# Patient Record
Sex: FEMALE | Race: ASIAN | URBAN - METROPOLITAN AREA
[De-identification: names, ages, dates, MRNs, and addresses within clinical notes are randomized per-mention and may not be internally consistent; named-entity substitution may affect disease eponyms.]

---

## 2018-03-13 ENCOUNTER — OFFICE VISIT (OUTPATIENT)
Dept: URGENT CARE | Facility: URGENT CARE | Age: 68
End: 2018-03-13
Payer: MEDICARE

## 2018-03-13 VITALS
RESPIRATION RATE: 18 BRPM | HEART RATE: 106 BPM | DIASTOLIC BLOOD PRESSURE: 79 MMHG | TEMPERATURE: 97.4 F | SYSTOLIC BLOOD PRESSURE: 144 MMHG | OXYGEN SATURATION: 96 %

## 2018-03-13 DIAGNOSIS — K21.9 GASTROESOPHAGEAL REFLUX DISEASE WITHOUT ESOPHAGITIS: Primary | ICD-10-CM

## 2018-03-13 PROCEDURE — 99203 OFFICE O/P NEW LOW 30 MIN: CPT | Performed by: PHYSICIAN ASSISTANT

## 2018-03-13 RX ORDER — OMEPRAZOLE 40 MG/1
40 CAPSULE, DELAYED RELEASE ORAL DAILY
Qty: 14 CAPSULE | Refills: 0 | Status: SHIPPED | OUTPATIENT
Start: 2018-03-13 | End: 2018-09-06

## 2018-03-13 ASSESSMENT — ENCOUNTER SYMPTOMS
CHILLS: 0
DIARRHEA: 0
VOMITING: 0
HEADACHES: 0
SHORTNESS OF BREATH: 0
NAUSEA: 0
ABDOMINAL PAIN: 0
FEVER: 0
FOCAL WEAKNESS: 0

## 2018-03-13 NOTE — MR AVS SNAPSHOT
After Visit Summary   3/13/2018    Ying Mejia    MRN: [id number]           Patient Information     Date Of Birth          1950        Visit Information        Provider Department      3/13/2018 7:40 PM Anika Hollingsworth PA-C [facility]        Today's Diagnoses     Gastroesophageal reflux disease without esophagitis    -  1      Care Instructions      Lifestyle Changes for Controlling GERD  When you have GERD, stomach acid feels as if it s backing up toward your mouth. Whether or not you take medicine to control your GERD, your symptoms can often be improved with lifestyle changes. Talk to your healthcare provider about the following suggestions. These suggestions may help you get relief from your symptoms.      Raise your head  Reflux is more likely to strike when you re lying down flat, because stomach fluid can flow backward more easily. Raising the head of your bed 4 to 6 inches can help. To do this:    Slide blocks or books under the legs at the head of your bed. Or, place a wedge under the mattress. [employer] can make a suitable wedge for you. The wedge should run from your waist to the top of your head.    Don t just prop your head on several pillows. This increases pressure on your stomach. It can make GERD worse.  Watch your eating habits  Certain foods may increase the acid in your stomach or relax the lower esophageal sphincter. This makes GERD more likely. It s best to avoid the following if they cause you symptoms:    Coffee, tea, and carbonated drinks (with and without caffeine)    Fatty, fried, or spicy food    Mint, chocolate, onions, and tomatoes    Peppermint    Any other foods that seem to irritate your stomach or cause you pain  Relieve the pressure  Tips include the following:    Eat smaller meals, even if you have to eat more often.    Don t lie down right after you eat. Wait a few hours for your stomach to empty.    Avoid tight  "belts and tight-fitting clothes.    Lose excess weight.  Tobacco and alcohol  Avoid smoking tobacco and drinking alcohol. They can make GERD symptoms worse.  Date Last Reviewed: 2016-2017 [employer]. [address] [place] [address], [place] [address]. All rights reserved. This information is not intended as a substitute for professional medical care. Always follow your healthcare professional's instructions.                Follow-ups after your visit        Who to contact     If you have questions or need follow up information about today's clinic visit or your schedule please contact [facility] directly at [phone].  Normal or non-critical lab and imaging results will be communicated to you by MyChart, letter or phone within 4 business days after the clinic has received the results. If you do not hear from us within 7 days, please contact the clinic through [employer] or phone. If you have a critical or abnormal lab result, we will notify you by phone as soon as possible.  Submit refill requests through [employer] or call your pharmacy and they will forward the refill request to us. Please allow 3 business days for your refill to be completed.          Additional Information About Your Visit        [employer] Information     [employer] lets you send messages to your doctor, view your test results, renew your prescriptions, schedule appointments and more. To sign up, go to [email or web address] . Click on \"Log in\" on the left side of the screen, which will take you to the Welcome page. Then click on \"Sign up Now\" on the right side of the page.     You will be asked to enter the access code listed below, as well as some personal information. Please follow the directions to create your username and password.     Your access code is: [email or web address]  Expires: 2018  8:05 PM     Your access code will  in 90 days. If you need help or a new code, please call your [place] clinic " or [phone].        Care EveryWhere ID     This is your Care EveryWhere ID. This could be used by other organizations to access your [facility] [employer] medical records  [id number]        Your Vitals Were     Pulse Temperature Respirations Pulse Oximetry          106 97.4  F (36.3  C) (Oral) 18 96%         Blood Pressure from Last 3 Encounters:   03/13/18 144/79   10/19/12 110/70   01/06/09 102/68    Weight from Last 3 Encounters:   10/19/12 140 lb 3.2 oz (63.6 kg)   01/06/09 131 lb (59.4 kg)              Today, you had the following     No orders found for display         Today's Medication Changes          These changes are accurate as of 3/13/18  8:05 PM.  If you have any questions, ask your nurse or doctor.               Start taking these medicines.        Dose/Directions    omeprazole 40 MG capsule   Commonly known as:  priLOSEC   Used for:  Gastroesophageal reflux disease without esophagitis   Started by:  Anika Hollingsworth PA-C        Dose:  40 mg   Take 1 capsule (40 mg) by mouth daily Take 30-60 minutes before a meal.   Quantity:  14 capsule   Refills:  0            Where to get your medicines      These medications were sent to [id number] - [place], [place] - [address]  [address] [place] [address]     Phone:  [phone]     omeprazole 40 MG capsule                Primary Care Provider Fax #    Provider Not In System [phone]                Equal Access to Services     TERESA HOWARD AH: Hadii hawk daviso Socarlie, waaxda luqadaha, qaybta kaalmada adeegyada, dave armas. So [facility] [phone].    ATENCIÓN: Si habla español, tiene a torres disposición servicios gratuitos de asistencia lingüística. Zhannaame al [phone].    We comply with applicable federal civil rights laws and [place] laws. We do not discriminate on the basis of race, color, national origin, age, disability, sex, sexual orientation, or gender identity.            Thank you!     Thank you for  choosing [facility]  for your care. Our goal is always to provide you with excellent care. Hearing back from our patients is one way we can continue to improve our services. Please take a few minutes to complete the written survey that you may receive in the mail after your visit with us. Thank you!             Your Updated Medication List - Protect others around you: Learn how to safely use, store and throw away your medicines at www.disposemymeds.org.          This list is accurate as of 3/13/18  8:05 PM.  Always use your most recent med list.                   Brand Name Dispense Instructions for use Diagnosis    ciprofloxacin 500 MG tablet    CIPRO    14 tablet    Take 1 tablet by mouth 2 times daily.    UTI (urinary tract infection)       omeprazole 40 MG capsule    priLOSEC    14 capsule    Take 1 capsule (40 mg) by mouth daily Take 30-60 minutes before a meal.    Gastroesophageal reflux disease without esophagitis

## 2018-03-14 NOTE — NURSING NOTE
Chief Complaint   Patient presents with     Dizziness     Dizziness for few days     Urgent Care       Initial /79  Pulse 106  Temp 97.4  F (36.3  C) (Oral)  Resp 18  SpO2 96% There is no height or weight on file to calculate BMI.  Medication Reconciliation: unable or not appropriate to perform    Gio Mckenzie, CMA

## 2018-03-14 NOTE — PATIENT INSTRUCTIONS
Lifestyle Changes for Controlling GERD  When you have GERD, stomach acid feels as if it s backing up toward your mouth. Whether or not you take medicine to control your GERD, your symptoms can often be improved with lifestyle changes. Talk to your healthcare provider about the following suggestions. These suggestions may help you get relief from your symptoms.      Raise your head  Reflux is more likely to strike when you re lying down flat, because stomach fluid can flow backward more easily. Raising the head of your bed 4 to 6 inches can help. To do this:    Slide blocks or books under the legs at the head of your bed. Or, place a wedge under the mattress. [employer] can make a suitable wedge for you. The wedge should run from your waist to the top of your head.    Don t just prop your head on several pillows. This increases pressure on your stomach. It can make GERD worse.  Watch your eating habits  Certain foods may increase the acid in your stomach or relax the lower esophageal sphincter. This makes GERD more likely. It s best to avoid the following if they cause you symptoms:    Coffee, tea, and carbonated drinks (with and without caffeine)    Fatty, fried, or spicy food    Mint, chocolate, onions, and tomatoes    Peppermint    Any other foods that seem to irritate your stomach or cause you pain  Relieve the pressure  Tips include the following:    Eat smaller meals, even if you have to eat more often.    Don t lie down right after you eat. Wait a few hours for your stomach to empty.    Avoid tight belts and tight-fitting clothes.    Lose excess weight.  Tobacco and alcohol  Avoid smoking tobacco and drinking alcohol. They can make GERD symptoms worse.  Date Last Reviewed: 7/1/2016 2000-2017 [employer]. [address] [place] [address]. All rights reserved. This information is not intended as a substitute for professional medical care. Always follow your healthcare  professional's instructions.

## 2018-03-14 NOTE — PROGRESS NOTES
HPI  March 13, 2018    HPI: Ying Mejia is a 67 year old female who complains of moderate increased gas & burping onset 4 days ago. Symptoms were present on Fri after eating lasagna, Sunday and today after drinking orange juice. She reports she feels slightly malaised when this happens until she can belch and get rid of the gas. No treatments tried. Denies fever/chills, HA, focal weakness, neck pain, CP, SOB, abd pain, N/V/D, rash, or any other symptoms.     History reviewed. No pertinent past medical history.  History reviewed. No pertinent surgical history.  Social History   Substance Use Topics     Smoking status: Never Smoker     Smokeless tobacco: Never Used     Alcohol use Not on file     There is no problem list on file for this patient.    Family History   Problem Relation Age of Onset     Family history unknown: Yes        Problem list, Medication list, Allergies, and Medical/Social/Surgical histories reviewed in [facility] and updated as appropriate.      Review of Systems   Constitutional: Positive for malaise/fatigue. Negative for chills and fever.   Respiratory: Negative for shortness of breath.    Cardiovascular: Negative for chest pain.   Gastrointestinal: Negative for abdominal pain, diarrhea, nausea and vomiting.        Gas/belching   Skin: Negative for rash.   Neurological: Negative for focal weakness and headaches.   All other systems reviewed and are negative.        Physical Exam   Constitutional: She is oriented to person, place, and time and well-developed, well-nourished, and in no distress.   HENT:   Head: Normocephalic and atraumatic.   Eyes: EOM are normal. Pupils are equal, round, and reactive to light.   Cardiovascular: Normal rate, regular rhythm and normal heart sounds.    Pulmonary/Chest: Effort normal and breath sounds normal.   Abdominal: Soft. Normal appearance. There is no tenderness.   Musculoskeletal: Normal range of motion.   Neurological: She is alert and oriented to person,  place, and time. She has normal motor skills and normal sensation. She has a normal Straight Leg Raise Test. She shows no pronator drift. Gait normal.   Skin: Skin is warm and dry.   Nursing note and vitals reviewed.      Vital Signs  /79  Pulse 106  Temp 97.4  F (36.3  C) (Oral)  Resp 18  SpO2 96%     Diagnostic Test Results:  none     ASSESSMENT/PLAN      ICD-10-CM    1. Gastroesophageal reflux disease without esophagitis K21.9 omeprazole (PRILOSEC) 40 MG capsule      Rx omeprazole, dietary modifications discussed, f/u with PCP.      I have discussed any lab or imaging results, the patient's diagnosis, and my plan of treatment with the patient and/or family. Patient is aware to come back in if with worsening symptoms or if no relief despite treatment plan.  Patient voiced understanding and had no further questions.       Follow Up: Data Unavailable    SANDI Uriostegui, PADeepC  [facility]

## 2018-03-20 ENCOUNTER — TELEPHONE (OUTPATIENT)
Dept: FAMILY MEDICINE | Facility: CLINIC | Age: 68
End: 2018-03-20

## 2018-03-21 NOTE — TELEPHONE ENCOUNTER
Per patient's online appointment request, please call patient to see if Dr. Mendes would be willing to accept her as a new patient.    I was recommened to you by Rosalia Sandoval I would like to come for a physical chek up ASAP I would like to fast 12 hrs and like to come early in the morning I have elevated blood sugar level. I appreciate if you could give an appointment ASAP Thank you Doris dowell.    Kathie NARAYAN  Central Scheduler

## 2018-03-22 NOTE — TELEPHONE ENCOUNTER
Called pt she was unaware of Dr Curry and did not know who she was. She will look at her profile and call back to decide.

## 2018-04-02 ENCOUNTER — OFFICE VISIT (OUTPATIENT)
Dept: FAMILY MEDICINE | Facility: CLINIC | Age: 68
End: 2018-04-02
Payer: MEDICARE

## 2018-04-02 ENCOUNTER — HOSPITAL ENCOUNTER (OUTPATIENT)
Dept: MAMMOGRAPHY | Facility: CLINIC | Age: 68
Discharge: HOME | End: 2018-04-02
Attending: INTERNAL MEDICINE | Admitting: INTERNAL MEDICINE
Payer: MEDICARE

## 2018-04-02 VITALS
WEIGHT: 139.6 LBS | BODY MASS INDEX: 23.83 KG/M2 | TEMPERATURE: 98.7 F | OXYGEN SATURATION: 98 % | DIASTOLIC BLOOD PRESSURE: 67 MMHG | SYSTOLIC BLOOD PRESSURE: 112 MMHG | HEART RATE: 88 BPM | HEIGHT: 64 IN

## 2018-04-02 DIAGNOSIS — Z11.59 SCREENING FOR VIRAL DISEASE: ICD-10-CM

## 2018-04-02 DIAGNOSIS — Z12.11 SCREEN FOR COLON CANCER: ICD-10-CM

## 2018-04-02 DIAGNOSIS — Z11.59 NEED FOR HEPATITIS C SCREENING TEST: ICD-10-CM

## 2018-04-02 DIAGNOSIS — M21.619 ASYMPTOMATIC BUNION: ICD-10-CM

## 2018-04-02 DIAGNOSIS — Z12.31 VISIT FOR SCREENING MAMMOGRAM: ICD-10-CM

## 2018-04-02 DIAGNOSIS — Z13.228 SCREENING FOR METABOLIC DISORDER: ICD-10-CM

## 2018-04-02 DIAGNOSIS — E78.2 MIXED HYPERLIPIDEMIA: ICD-10-CM

## 2018-04-02 DIAGNOSIS — Z23 NEED FOR PROPHYLACTIC VACCINATION AGAINST STREPTOCOCCUS PNEUMONIAE (PNEUMOCOCCUS): ICD-10-CM

## 2018-04-02 DIAGNOSIS — R73.09 ELEVATED GLUCOSE: ICD-10-CM

## 2018-04-02 DIAGNOSIS — Z23 NEED FOR PROPHYLACTIC VACCINATION WITH TETANUS-DIPHTHERIA (TD): ICD-10-CM

## 2018-04-02 DIAGNOSIS — E11.65 POORLY CONTROLLED DIABETES MELLITUS (H): ICD-10-CM

## 2018-04-02 DIAGNOSIS — Z78.0 ASYMPTOMATIC POSTMENOPAUSAL STATUS: ICD-10-CM

## 2018-04-02 DIAGNOSIS — Z13.220 LIPID SCREENING: ICD-10-CM

## 2018-04-02 DIAGNOSIS — M20.42 HAMMER TOES, BILATERAL: ICD-10-CM

## 2018-04-02 DIAGNOSIS — M20.41 HAMMER TOES, BILATERAL: ICD-10-CM

## 2018-04-02 DIAGNOSIS — Z00.00 ROUTINE HISTORY AND PHYSICAL EXAMINATION OF ADULT: Primary | ICD-10-CM

## 2018-04-02 LAB
ANION GAP SERPL CALCULATED.3IONS-SCNC: 9 MMOL/L (ref 3–14)
BUN SERPL-MCNC: 9 MG/DL (ref 7–30)
CALCIUM SERPL-MCNC: 8.7 MG/DL (ref 8.5–10.1)
CHLORIDE SERPL-SCNC: 104 MMOL/L (ref 94–109)
CHOLEST SERPL-MCNC: 216 MG/DL
CO2 SERPL-SCNC: 25 MMOL/L (ref 20–32)
CREAT SERPL-MCNC: 0.51 MG/DL (ref 0.52–1.04)
CREAT UR-MCNC: 94 MG/DL
GFR SERPL CREATININE-BSD FRML MDRD: >90 ML/MIN/1.7M2
GLUCOSE SERPL-MCNC: 158 MG/DL (ref 70–99)
HBA1C MFR BLD: 9.1 % (ref 0–6.4)
HCV AB SERPL QL IA: NONREACTIVE
HDLC SERPL-MCNC: 47 MG/DL
LDLC SERPL CALC-MCNC: 156 MG/DL
MICROALBUMIN UR-MCNC: 7 MG/L
MICROALBUMIN/CREAT UR: 7.18 MG/G CR (ref 0–25)
NONHDLC SERPL-MCNC: 169 MG/DL
POTASSIUM SERPL-SCNC: 3.4 MMOL/L (ref 3.4–5.3)
SODIUM SERPL-SCNC: 138 MMOL/L (ref 133–144)
TRIGL SERPL-MCNC: 64 MG/DL

## 2018-04-02 PROCEDURE — 82043 UR ALBUMIN QUANTITATIVE: CPT | Performed by: INTERNAL MEDICINE

## 2018-04-02 PROCEDURE — 90471 IMMUNIZATION ADMIN: CPT | Performed by: INTERNAL MEDICINE

## 2018-04-02 PROCEDURE — 90714 TD VACC NO PRESV 7 YRS+ IM: CPT | Mod: GY | Performed by: INTERNAL MEDICINE

## 2018-04-02 PROCEDURE — 99213 OFFICE O/P EST LOW 20 MIN: CPT | Mod: 25 | Performed by: INTERNAL MEDICINE

## 2018-04-02 PROCEDURE — 99207 C FOOT EXAM  NO CHARGE: CPT | Mod: 25 | Performed by: INTERNAL MEDICINE

## 2018-04-02 PROCEDURE — 90670 PCV13 VACCINE IM: CPT | Performed by: INTERNAL MEDICINE

## 2018-04-02 PROCEDURE — 80061 LIPID PANEL: CPT | Performed by: INTERNAL MEDICINE

## 2018-04-02 PROCEDURE — G0472 HEP C SCREEN HIGH RISK/OTHER: HCPCS | Performed by: INTERNAL MEDICINE

## 2018-04-02 PROCEDURE — 36415 COLL VENOUS BLD VENIPUNCTURE: CPT | Performed by: INTERNAL MEDICINE

## 2018-04-02 PROCEDURE — 83036 HEMOGLOBIN GLYCOSYLATED A1C: CPT | Performed by: INTERNAL MEDICINE

## 2018-04-02 PROCEDURE — G0402 INITIAL PREVENTIVE EXAM: HCPCS | Performed by: INTERNAL MEDICINE

## 2018-04-02 PROCEDURE — 77067 SCR MAMMO BI INCL CAD: CPT

## 2018-04-02 PROCEDURE — 80048 BASIC METABOLIC PNL TOTAL CA: CPT | Performed by: INTERNAL MEDICINE

## 2018-04-02 PROCEDURE — G0009 ADMIN PNEUMOCOCCAL VACCINE: HCPCS | Mod: 59 | Performed by: INTERNAL MEDICINE

## 2018-04-02 RX ORDER — ATORVASTATIN CALCIUM 10 MG/1
10 TABLET, FILM COATED ORAL DAILY
Qty: 90 TABLET | Refills: 1 | Status: SHIPPED | OUTPATIENT
Start: 2018-04-02 | End: 2018-09-06

## 2018-04-02 RX ORDER — METFORMIN HCL 500 MG
1000 TABLET, EXTENDED RELEASE 24 HR ORAL
Qty: 60 TABLET | Refills: 3 | Status: SHIPPED | OUTPATIENT
Start: 2018-04-02 | End: 2018-08-17

## 2018-04-02 NOTE — MR AVS SNAPSHOT
After Visit Summary   4/2/2018    Ying Mejia    MRN: [id number]           Patient Information     Date Of Birth          1950        Visit Information        Provider Department      4/2/2018 8:30 AM Kiera Curry MD [facility]        Today's Diagnoses     Routine history and physical examination of adult    -  1    Screen for colon cancer        Asymptomatic postmenopausal status        Visit for screening mammogram        Need for hepatitis C screening test        Need for prophylactic vaccination against Streptococcus pneumoniae (pneumococcus)        Need for prophylactic vaccination with tetanus-diphtheria (TD)        Screening for viral disease        Elevated glucose        Erb's palsy as birth trauma        Lipid screening        Screening for metabolic disorder        Hammer toes, bilateral        Asymptomatic bunion        Poorly controlled diabetes mellitus (H)          Care Instructions      Preventive Health Recommendations    Female Ages 65 +    Yearly exam:     See your health care provider every year in order to  o Review health changes.   o Discuss preventive care.    o Review your medicines if your doctor has prescribed any.      You no longer need a yearly Pap test unless you've had an abnormal Pap test in the past 10 years. If you have vaginal symptoms, such as bleeding or discharge, be sure to talk with your provider about a Pap test.      Every 1 to 2 years, have a mammogram.  If you are over 69, talk with your health care provider about whether or not you want to continue having screening mammograms.      Every 10 years, have a colonoscopy. Or, have a yearly FIT test (stool test). These exams will check for colon cancer.       Have a cholesterol test every 5 years, or more often if your doctor advises it.       Have a diabetes test (fasting glucose) every three years. If you are at risk for diabetes, you should have this test more often.       At age  65, have a bone density scan (DEXA) to check for osteoporosis (brittle bone disease).    Shots:    Get a flu shot each year.    Get a tetanus shot every 10 years.    Talk to your doctor about your pneumonia vaccines. There are now two you should receive - Pneumovax (PPSV 23) and Prevnar (PCV 13).    Talk to your doctor about the shingles vaccine.    Talk to your doctor about the hepatitis B vaccine.    Nutrition:     Eat at least 5 servings of fruits and vegetables each day.      Eat whole-grain bread, whole-wheat pasta and brown rice instead of white grains and rice.      Talk to your provider about Calcium and Vitamin D.     Lifestyle    Exercise at least 150 minutes a week (30 minutes a day, 5 days a week). This will help you control your weight and prevent disease.      Limit alcohol to one drink per day.      No smoking.       Wear sunscreen to prevent skin cancer.       See your dentist twice a year for an exam and cleaning.      See your eye doctor every 1 to 2 years to screen for conditions such as glaucoma, macular degeneration and cataracts.    Labs today  Pneumonia and tetanus shots today  You are due for mammogram.  Please call the following number to make appointment :  [phone]  Schedule colonoscopy at your earliest convenience by calling the following number:  Naomi Hewitt [phone]  Schedule an appointment for bone density scan at your earliest convenience  Check with your insurance company about coverage for new Shingles vaccine, which is now available  It's called SHINGRIX and is a series of two shots  Make appointment with diabetic educator  Start taking metformin and take once daily for one week then take two tablets daily  Follow up in 1 months  Seek sooner medical attention if there is any worsening of symptoms or problemsWhat Is Diabetes?  If you have diabetes, your body does not make enough insulin (a hormone), or the insulin it makes does not work the way it should. Diabetes  is a lifelong disease.  Glucose (sugar) is your body's main source of energy. Insulin carries glucose from the bloodstream into your body's cells. But if you have diabetes, glucose builds up in your blood. This is called high blood glucose (high blood sugar).  High blood glucose can lead to damage in many parts of your body, including your eyes, kidneys, heart, blood vessels, nerves and skin.  What are the symptoms of diabetes?  Symptoms include:    Extreme thirst    Needing to urinate more often    Headache    Hunger    Blurred vision    Feeling drowsy or tired    Slow healing after an illness or injury    Frequent infections.  What should I do if I have diabetes?  Your first step is to learn how to manage your diabetes. The goal is to keep your blood glucose as close to normal as possible. (A normal level is 70 to 100.) By doing this, you can prevent or control damage to your body.  To manage your diabetes, you will need to:    Eat a wide range of healthy foods.    Manage your weight.    Be physically active.    Test your blood glucose as prescribed.    Control your blood pressure and cholesterol.    Take your medicines as prescribed.  Are there different kinds of diabetes?  There are two basic kinds of diabetes: type 1 and type 2.  Type 1 diabetes  Type 1 diabetes occurs when the cells that make insulin are destroyed by your body's immune system. Your body can no longer make insulin on its own. People who have type 1 diabetes must take insulin to manage it.  Type 2 diabetes  Type 2 diabetes occurs when the cells of your body cannot use insulin or glucose normally. Over time, your body cannot make enough insulin to meet your body's needs. This is the most common kind of diabetes.  You are more likely to develop type 2 diabetes if you:    Are overweight    Have high blood pressure    Have high cholesterol    Are not physically active    Have a family history of type 2 diabetes    Are ,  /, ,  American or     Are a woman who has given birth to a baby weighing over 9 pounds, or you have had gestational diabetes (diabetes that occurs in pregnancy).  For informational purposes only. Not to replace the advice of your health care provider.  Copyright   2006 [facility]. All rights reserved. bitHound 857643 - REV 12/15.            Follow-ups after your visit        Additional Services     DIABETES EDUCATOR REFERRAL       DIABETES SELF MANAGEMENT TRAINING (DSMT)      Your provider has referred you to Diabetes Education: FMG: Diabetes Education - All [facility]3] [phone]   [email or web address]     If an urgent visit is needed or A1C is above 12, Care Team to call the Diabetes  Education Team at [phone] or send an In Basket message to the Diabetes Education Pool (P DIAB ED-PATIENT CARE).    A  will call you to make your appointment. If it has been more than 3 business days since your referral was placed, please call the above phone number to schedule.    Type of training and number of hours: New Diagnosis: Initial group DSMT - 10 hours.      Medicare covers: 10 hours of initial DSMT in 12 month period from the time of first visit, plus 2 hours of follow-up DSMT annually, and additional hours as requested for insulin training.    Diabetes Type: Type 2 - On Oral Medication             Diabetes Co-Morbidities: none               A1C Goal:  <7.0       A1C is: Lab Results       Component                Value               Date                       A1C                      9.1                 04/02/2018              Diabetes Education Topics: Comprehensive Knowledge Assessment and Instruction    Special Educational Needs Requiring Individual DSMT: None       MEDICAL NUTRITION THERAPY (MNT) for Diabetes    Medical Nutrition Therapy with a Registered Dietitian can be provided in  coordination with Diabetes Self-Management Training to assist in achieving optimal diabetes management.    MNT Type and Hours: Additional MNT in the same calendar year per RD recommendation: per dietician hours,                    Medicare will cover: 3 hours initial MNT in 12 month period after first visit, plus 2 hours of follow-up MNT annually    Please be aware that coverage of these services is subject to the terms and limitations of your health insurance plan.  Call member services at your health plan to determine Diabetes Self-Management Training (Codes  &amp; ) and Medical Nutrition Therapy (Codes 97205 & 97147) benefits and ask which blood glucose monitor brands are covered by your plan.  Please bring the following with you to your appointment:    (1)  List of current medications   (2)  List of Blood Glucose Monitor brands that are covered by your insurance plan  (3)  Blood Glucose Monitor and log book  (4)   Food records for the 3 days prior to your visit    The Certified Diabetes Educator may make diabetes medication adjustments per the CDE Protocol and Collaborative Practice Agreement.            GASTROENTEROLOGY ADULT REF PROCEDURE ONLY Naomi Hewitt [phone]       Last Lab Result: No results found for: CR  Body mass index is 23.96 kg/(m^2).     Needed:  No  Language:  English    Patient will be contacted to schedule procedure.     Please be aware that coverage of these services is subject to the terms and limitations of your health insurance plan.  Call member services at your health plan with any benefit or coverage questions.  Any procedures must be performed at a [place] facility OR coordinated by your clinic's referral office.    Please bring the following with you to your appointment:    (1) Any X-Rays, CTs or MRIs which have been performed.  Contact the facility where they were done to arrange for  prior to your scheduled appointment.    (2) List of  "current medications   (3) This referral request   (4) Any documents/labs given to you for this referral                  Future tests that were ordered for you today     Open Future Orders        Priority Expected Expires Ordered    DEXA HIP/PELVIS/SPINE - Future Routine  2019    MA SCREENING DIGITAL BILAT - Future  (s+30) Routine  2019            Who to contact     If you have questions or need follow up information about today's clinic visit or your schedule please contact [facility] directly at [phone].  Normal or non-critical lab and imaging results will be communicated to you by PrivateGriffehart, letter or phone within 4 business days after the clinic has received the results. If you do not hear from us within 7 days, please contact the clinic through Keep Me Certifiedt or phone. If you have a critical or abnormal lab result, we will notify you by phone as soon as possible.  Submit refill requests through [email or web address] or call your pharmacy and they will forward the refill request to us. Please allow 3 business days for your refill to be completed.          Additional Information About Your Visit        PrivateGriffeharAmmado Information     [email or web address] lets you send messages to your doctor, view your test results, renew your prescriptions, schedule appointments and more. To sign up, go to [email or web address] . Click on \"Log in\" on the left side of the screen, which will take you to the Welcome page. Then click on \"Sign up Now\" on the right side of the page.     You will be asked to enter the access code listed below, as well as some personal information. Please follow the directions to create your username and password.     Your access code is: [email or web address]  Expires: 2018  8:05 PM     Your access code will  in 90 days. If you need help or a new code, please call your [facility]3] or [phone].        Care EveryWhere ID     This is your Care EveryWhere ID. This could be used by other " "organizations to access your [place] medical records  [id number]        Your Vitals Were     Pulse Temperature Height Last Period Pulse Oximetry BMI (Body Mass Index)    88 98.7  F (37.1  C) (Oral) 5' 4\" (1.626 m) 04/02/2003 (Approximate) 98% 23.96 kg/m2       Blood Pressure from Last 3 Encounters:   04/02/18 112/67   03/13/18 144/79   10/19/12 110/70    Weight from Last 3 Encounters:   04/02/18 139 lb 9.6 oz (63.3 kg)   10/19/12 140 lb 3.2 oz (63.6 kg)   01/06/09 131 lb (59.4 kg)              We Performed the Following     Basic metabolic panel     DIABETES EDUCATOR REFERRAL     GASTROENTEROLOGY ADULT REF PROCEDURE ONLY Dannyashish Kash [phone]     Hemoglobin A1c     Hepatitis C Screen Reflex to HCV RNA Quant and Genotype     Lipid panel reflex to direct LDL Fasting          Today's Medication Changes          These changes are accurate as of 4/2/18 11:01 AM.  If you have any questions, ask your nurse or doctor.               Start taking these medicines.        Dose/Directions    aspirin 81 MG tablet   Used for:  Poorly controlled diabetes mellitus (H)   Started by:  Kiera Curry MD        Dose:  81 mg   Take 1 tablet (81 mg) by mouth daily   Quantity:  90 tablet   Refills:  3       blood glucose lancets standard   Commonly known as:  no brand specified   Used for:  Poorly controlled diabetes mellitus (H)   Started by:  Kiera Curry MD        Use to test blood sugar 2 times daily or as directed.   Quantity:  100 each   Refills:  1       blood glucose monitoring meter device kit   Commonly known as:  no brand specified   Used for:  Poorly controlled diabetes mellitus (H)   Started by:  Kiera Curry MD        Use to test blood sugar 2 times daily or as directed.   Quantity:  1 kit   Refills:  0       blood glucose monitoring test strip   Commonly known as:  no brand specified   Used for:  Poorly controlled diabetes mellitus (H)   Started by:  Kiera Curry MD        Use to test blood " sugars 2 times daily or as directed   Quantity:  100 strip   Refills:  1       metFORMIN 500 MG 24 hr tablet   Commonly known as:  GLUCOPHAGE-XR   Used for:  Poorly controlled diabetes mellitus (H)   Started by:  Kiera Curry MD        Dose:  1000 mg   Take 2 tablets (1,000 mg) by mouth daily (with dinner)   Quantity:  60 tablet   Refills:  3            Where to get your medicines      These medications were sent to [id number] - [place], [place] - [address]  [address] [place] [address]     Phone:  [phone]     blood glucose lancets standard    blood glucose monitoring meter device kit    blood glucose monitoring test strip    metFORMIN 500 MG 24 hr tablet         Some of these will need a paper prescription and others can be bought over the counter.  Ask your nurse if you have questions.     You don't need a prescription for these medications     aspirin 81 MG tablet                Primary Care Provider Office Phone # Fax #    Kiera Curry [phone] [address]  [place] [address]        Equal Access to Services     [facility] Hospital[facility] AH: Hadii hawk ku hadasho Lilia, waaxda luqadaha, qaybta kaalmada adeleathayashavon, dave moctezuma . So [facility] [phone].    ATENCIÓN: Si habla español, tiene a torres disposición servicios gratuitos de asistencia lingüística. Llame al [phone].    We comply with applicable federal civil rights laws and [place] laws. We do not discriminate on the basis of race, color, national origin, age, disability, sex, sexual orientation, or gender identity.            Thank you!     Thank you for choosing [facility]  for your care. Our goal is always to provide you with excellent care. Hearing back from our patients is one way we can continue to improve our services. Please take a few minutes to complete the written survey that you may receive in the mail after your visit with us. Thank you!              Your Updated Medication List - Protect others around you: Learn how to safely use, store and throw away your medicines at www.disposemymeds.org.          This list is accurate as of 4/2/18 11:01 AM.  Always use your most recent med list.                   Brand Name Dispense Instructions for use Diagnosis    aspirin 81 MG tablet     90 tablet    Take 1 tablet (81 mg) by mouth daily    Poorly controlled diabetes mellitus (H)       blood glucose lancets standard    no brand specified    100 each    Use to test blood sugar 2 times daily or as directed.    Poorly controlled diabetes mellitus (H)       blood glucose monitoring meter device kit    no brand specified    1 kit    Use to test blood sugar 2 times daily or as directed.    Poorly controlled diabetes mellitus (H)       blood glucose monitoring test strip    no brand specified    100 strip    Use to test blood sugars 2 times daily or as directed    Poorly controlled diabetes mellitus (H)       metFORMIN 500 MG 24 hr tablet    GLUCOPHAGE-XR    60 tablet    Take 2 tablets (1,000 mg) by mouth daily (with dinner)    Poorly controlled diabetes mellitus (H)       omeprazole 40 MG capsule    priLOSEC    14 capsule    Take 1 capsule (40 mg) by mouth daily Take 30-60 minutes before a meal.    Gastroesophageal reflux disease without esophagitis

## 2018-04-02 NOTE — LETTER
[facility]  [address] [facility]  [address]  [place]  [address]  Tel: [phone]    April 3, 2018    [address] [facility] [address]        Nicho He,    This is to inform you regarding your test result.    I spoke to you on phone but sending you a result note also.  You have new diagnosis of type 2 diabetes.  HbA1c which is average glucose during last 3 months is 9.1%.  Lab Results       Component                Value               Date                       A1C                      9.1                 04/02/2018            Your diabetes is not under control.   Please make appointment with diabetic educator  This is very important that you see diabetic educator  You will learn a lot from diabetic educator  Your total cholesterol is elevated.  HDL which is called good cholesterol is low.  Your LDL which is called bad cholesterol is elevated.  Take lipitor as prescribed   Eat low cholesterol low fat  diet and do regular physical activity. Avoid high sugar containing food.  Liver and kidney function is fine  Urine for Microalbumin is normal.  Hepatitis C Antibody is negative    Sincerely,      Dr.Nasima Antoinette MD,FACP / kd     Results for orders placed or performed in visit on 04/02/18   Hepatitis C Screen Reflex to HCV RNA Quant and Genotype   Result Value Ref Range    Hepatitis C Antibody Nonreactive NR^Nonreactive   Lipid panel reflex to direct LDL Fasting   Result Value Ref Range    Cholesterol 216 (H) <200 mg/dL    Triglycerides 64 <150 mg/dL    HDL Cholesterol 47 (L) >49 mg/dL    LDL Cholesterol Calculated 156 (H) <100 mg/dL    Non HDL Cholesterol 169 (H) <130 mg/dL   Hemoglobin A1c   Result Value Ref Range    Hemoglobin A1C 9.1 (H) 0 - 6.4 %   Basic metabolic panel   Result Value Ref Range    Sodium 138 133 - 144 mmol/L    Potassium 3.4 3.4 - 5.3 mmol/L    Chloride 104 94 - 109 mmol/L    Carbon Dioxide 25 20 - 32 mmol/L    Anion Gap 9 3 - 14 mmol/L    Glucose 158 (H) 70 -  99 mg/dL    Urea Nitrogen 9 7 - 30 mg/dL    Creatinine 0.51 (L) 0.52 - 1.04 mg/dL    GFR Estimate >90 >60 mL/min/1.7m2    GFR Estimate If Black >90 >60 mL/min/1.7m2    Calcium 8.7 8.5 - 10.1 mg/dL   Albumin Random Urine Quantitative with Creat Ratio   Result Value Ref Range    Creatinine Urine 94 mg/dL    Albumin Urine mg/L 7 mg/L    Albumin Urine mg/g Cr 7.18 0 - 25 mg/g Cr

## 2018-04-02 NOTE — PROGRESS NOTES
SUBJECTIVE:   Ying Mejia is a 67 year old female who presents for Preventive Visit.      Are you in the first 12 months of your Medicare Part B coverage?  Yes,  Visual Acuity:  Right Eye: 10/40   Left Eye: 10/40  Both Eyes: 10/20    Healthy Habits:    Do you get at least three servings of calcium containing foods daily (dairy, green leafy vegetables, etc.)? yes    Amount of exercise or daily activities, outside of work: 4 days one hour each    Problems taking medications regularly No    Medication side effects: No    Have you had an eye exam in the past two years? no    Do you see a dentist twice per year? yes    Do you have sleep apnea, excessive snoring or daytime drowsiness?no      Ability to successfully perform activities of daily living: Yes, no assistance needed    Home safety:  none identified     Hearing impairment: No    Fall risk:  Fallen 2 or more times in the past year?: No  Any fall with injury in the past year?: No        COGNITIVE SCREEN  1) Repeat 3 items (Banana, Sunrise, Chair)    2) Clock draw: NORMAL  3) 3 item recall: Recalls 2 objects   Results: NORMAL clock, 1-2 items recalled: COGNITIVE IMPAIRMENT LESS LIKELY    Mini-CogTM Copyright GISELLA Jimenez. Licensed by the author for use in [facility]; reprinted with permission (sochelsea@[facility]). All rights reserved.            Reviewed and updated as needed this visit by clinical staff  Tobacco  Allergies  Meds  Problems  Soc Hx        Reviewed and updated as needed this visit by Provider  Allergies  Meds  Problems        Social History   Substance Use Topics     Smoking status: Never Smoker     Smokeless tobacco: Never Used     Alcohol use No       If you drink alcohol do you typically have >3 drinks per day or >7 drinks per week? No                        Today's PHQ-2 Score:   PHQ-2 ( 1999 [employer]) 4/2/2018   Q1: Little interest or pleasure in doing things 0   Q2: Feeling down, depressed or hopeless 0   PHQ-2 Score 0       Do  "you feel safe in your environment - Yes    Do you have a Health Care Directive?: No: Advance care planning reviewed with patient; information given to patient to review.    Current providers sharing in care for this patient include:   Patient Care Team:  Kiera Curry MD as PCP - General (Internal Medicine)    The following health maintenance items are reviewed in Epic and correct as of today:  Health Maintenance   Topic Date Due     FOOT EXAM Q1 YEAR  12/25/1951     EYE EXAM Q1 YEAR  12/25/1951     COLON CANCER SCREEN (SYSTEM ASSIGNED)  12/25/2001     ADVANCE DIRECTIVE PLANNING Q5 YRS  12/25/2006     FALL RISK ASSESSMENT  12/25/2016     DEXA SCAN SCREENING (SYSTEM ASSIGNED)  12/25/2016     INFLUENZA VACCINE (SYSTEM ASSIGNED)  09/01/2018     A1C Q6 MO  10/02/2018     CREATININE Q1 YEAR  04/02/2019     LIPID MONITORING Q1 YEAR  04/02/2019     MICROALBUMIN Q1 YEAR  04/02/2019     PNEUMOCOCCAL (2 of 2 - PPSV23) 04/02/2019     TSH W/ FREE T4 REFLEX Q2 YEAR  03/15/2020     MAMMO SCREEN Q2 YR (SYSTEM ASSIGNED)  04/02/2020     TETANUS IMMUNIZATION (SYSTEM ASSIGNED)  04/02/2028     HEPATITIS C SCREENING  Completed     Labs reviewed in [facility]    Labs in care everywhere was reviewed and she already had TSH done    ROS:  Constitutional, HEENT, cardiovascular, pulmonary, GI, , musculoskeletal, neuro, skin, endocrine and psych systems are negative, except as otherwise noted.    This document serves as a record of the services and decisions personally performed and made by Kiera Curry MD. It was created on her behalf by Shiloh Gill, a trained medical scribe. The creation of this document is based on the provider's statements to the medical scribe.  Shiloh Gill 8:35 AM April 2, 2018    OBJECTIVE:   /67 (BP Location: Right arm, Cuff Size: Adult Regular)  Pulse 88  Temp 98.7  F (37.1  C) (Oral)  Ht 5' 4\" (1.626 m)  Wt 139 lb 9.6 oz (63.3 kg)  LMP 04/02/2003 (Approximate)  SpO2 98%  BMI 23.96 kg/m2 " "Estimated body mass index is 23.96 kg/(m^2) as calculated from the following:    Height as of this encounter: 5' 4\" (1.626 m).    Weight as of this encounter: 139 lb 9.6 oz (63.3 kg).    EXAM:   GENERAL APPEARANCE: healthy, alert and no distress  EYES: Eyes grossly normal to inspection, PERRL and conjunctivae and sclerae normal  HENT: ear canals and TM's normal, nose and mouth without ulcers or lesions, oropharynx clear and oral mucous membranes moist  NECK: no adenopathy, no asymmetry, masses, or scars and thyroid normal to palpation  RESP: lungs clear to auscultation - no rales, rhonchi or wheezes  BREAST: normal without masses, tenderness or nipple discharge and no palpable axillary masses or adenopathy  CV: regular rate and rhythm, normal S1 S2, no S3 or S4, no murmur, click or rub, no peripheral edema and peripheral pulses strong  ABDOMEN: soft, nontender, no hepatosplenomegaly, no masses and bowel sounds normal  MS: scoliosis, bilateral hammer toes and bunions, gait is age appropriate without ataxia  SKIN: no suspicious lesions or rashes  NEURO: Normal strength and tone, sensory exam grossly normal, mentation intact and speech normal  PSYCH: mentation appears normal and affect normal/bright    ASSESSMENT / PLAN:   Ying was seen today for wellness visit.    Diagnoses and all orders for this visit:    Routine history and physical examination of adult  -     Hepatitis C Screen Reflex to HCV RNA Quant and Genotype  -     Lipid panel reflex to direct LDL Fasting  Patient came to establish care  She hasn't had a physical in a long time  I spent extensive amount of time collecting medical, past, family, social, immunization, and surgical history    Screen for colon cancer  -     GASTROENTEROLOGY ADULT REF PROCEDURE ONLY Na[facility] [phone]  Patient recalls colonoscopy about 15 years ago  Due for colonoscopy  Referred her  She will schedule it at her convenience  No family history of colon " cancer    Asymptomatic postmenopausal status  -     DEXA HIP/PELVIS/SPINE - Future; Future  She hasn't had Dexa scan before  Referred her  She will schedule it at her convenience    Visit for screening mammogram  -     MA SCREENING DIGITAL BILAT - Future  (s+30); Future  She's overdue for mammogram  Referred her  She will schedule it at her convenience  No family history of breast cancer    Need for hepatitis C screening test  -     Hepatitis C Screen Reflex to HCV RNA Quant and Genotype    Need for prophylactic vaccination against Streptococcus pneumoniae (pneumococcus)  Prevnar today    Need for prophylactic vaccination with tetanus-diphtheria (TD)  Td today    Screening for viral disease  -     Hepatitis C Screen Reflex to HCV RNA Quant and Genotype    Elevated glucose  -     Hemoglobin A1c  -     Cancel: Glucose  She has had elevated glucose in the past  Hasn't tested a1c in the past  Lab tests today  Informed patient that I will ask her to follow up soon if a1c is highly elevated.  It was is still in the clinic when we got the A1c result.  The note below    Erb's palsy as birth trauma  Comments:  causing left arm weakness  She doesn't drive or perform many tasks with her left arm  However, reports she is able to function    Lipid screening  -     Lipid panel reflex to direct LDL Fasting    Screening for metabolic disorder  -     Basic metabolic panel    Hammer toes, bilateral  Patient has bilateral hammer toes and bunions  They don't affect her walking  Denies any restriction in movement or pain    Asymptomatic bunion  See above    Poorly controlled diabetes mellitus (H)  -     DIABETES EDUCATOR REFERRAL  -     metFORMIN (GLUCOPHAGE-XR) 500 MG 24 hr tablet; Take 2 tablets (1,000 mg) by mouth daily (with dinner)  -     blood glucose monitoring (NO BRAND SPECIFIED) meter device kit; Use to test blood sugar 2 times daily or as directed.  -     blood glucose monitoring (NO BRAND SPECIFIED) test strip; Use to test  blood sugars 2 times daily or as directed  -     blood glucose (NO BRAND SPECIFIED) lancets standard; Use to test blood sugar 2 times daily or as directed.  -     aspirin 81 MG tablet; Take 1 tablet (81 mg) by mouth daily  -     Albumin Random Urine Quantitative with Creat Ratio  I brought the patient back into the room and educated her about her diabetes diagnosis  She was is still in the building when the results came back  Her a1c was 9.1  Provided her with general information about diabetes  Advised her to set up appointment with diabetic educator  Advised her to follow up in 1 month  Lab Results   Component Value Date    A1C 9.1 04/02/2018     Patient was confused about follow ups  I went over her plan again and answered all her questions  Diabetic foot exam was also performed  Patient has had episodes of flatulence and burpeing  She was seen by UC and was prescribed Prilosec  Advised her to try Zantac and see how it works for her   I also discussed with her about starting the Lipitor.  I gave her a call when the results came back.  Patient Instructions   Labs today  Pneumonia and tetanus shots today  You are due for mammogram.  Please call the following number to make appointment :  [phone]  Schedule colonoscopy at your earliest convenience by calling the following number:  [facility] [phone]  Schedule an appointment for bone density scan at your earliest convenience  Check with your insurance company about coverage for new Shingles vaccine, which is now available  It's called SHINGRIX and is a series of two shots  Follow up in 4 months  Seek sooner medical attention if there is any worsening of symptoms or problems    End of Life Planning:  Patient currently has an advanced directive: No.  I have verified the patient's ablity to prepare an advanced directive/make health care decisions.  Literature was provided to assist patient in preparing an advanced directive.    COUNSELING:  Reviewed  "preventive health counseling, as reflected in patient instructions       Regular exercise       Healthy diet/nutrition       Vision screening       Immunizations    Vaccinated for: Pneumococcal and Td    We educated her about seeing eye doctor       Osteoporosis Prevention/Bone Health       Colon cancer screening       Hepatitis C screening    Estimated body mass index is 23.96 kg/(m^2) as calculated from the following:    Height as of this encounter: 5' 4\" (1.626 m).    Weight as of this encounter: 139 lb 9.6 oz (63.3 kg).       reports that she has never smoked. She has never used smokeless tobacco.    This was her first visit with me and there were too many things to cover   Especially she got the new diagnosis of diabetes  I told her to follow-up in a month so we can discuss other concerns  The importance of seeing diabetic educator is discussed with the patient  appropriate preventive services were discussed with this patient, including applicable screening as appropriate for cardiovascular disease, diabetes, osteopenia/osteoporosis, and glaucoma.  As appropriate for age/gender, discussed screening for colorectal cancer, prostate cancer, breast cancer, and cervical cancer. Checklist reviewing preventive services available has been given to the patient.    Reviewed patients plan of care and provided an AVS. The Basic Care Plan (routine screening as documented in Health Maintenance) for Ying meets the Care Plan requirement. This Care Plan has been established and reviewed with the Patient.    Counseling Resources:  ATP IV Guidelines  Pooled Cohorts Equation Calculator  Breast Cancer Risk Calculator  FRAX Risk Assessment  ICSI Preventive Guidelines  Dietary Guidelines for Americans, 2010  USDA's MyPlate  ASA Prophylaxis  Lung CA Screening        The information in this document, created by the medical scribe for me, accurately reflects the services I personally performed and the decisions made by me. I have " reviewed and approved this document for accuracy prior to leaving the patient care area.  April 2, 2018 9:15 AM    Kiera Curry MD  [facility]

## 2018-04-02 NOTE — PATIENT INSTRUCTIONS
Preventive Health Recommendations    Female Ages 65 +    Yearly exam:     See your health care provider every year in order to  o Review health changes.   o Discuss preventive care.    o Review your medicines if your doctor has prescribed any.      You no longer need a yearly Pap test unless you've had an abnormal Pap test in the past 10 years. If you have vaginal symptoms, such as bleeding or discharge, be sure to talk with your provider about a Pap test.      Every 1 to 2 years, have a mammogram.  If you are over 69, talk with your health care provider about whether or not you want to continue having screening mammograms.      Every 10 years, have a colonoscopy. Or, have a yearly FIT test (stool test). These exams will check for colon cancer.       Have a cholesterol test every 5 years, or more often if your doctor advises it.       Have a diabetes test (fasting glucose) every three years. If you are at risk for diabetes, you should have this test more often.       At age 65, have a bone density scan (DEXA) to check for osteoporosis (brittle bone disease).    Shots:    Get a flu shot each year.    Get a tetanus shot every 10 years.    Talk to your doctor about your pneumonia vaccines. There are now two you should receive - Pneumovax (PPSV 23) and Prevnar (PCV 13).    Talk to your doctor about the shingles vaccine.    Talk to your doctor about the hepatitis B vaccine.    Nutrition:     Eat at least 5 servings of fruits and vegetables each day.      Eat whole-grain bread, whole-wheat pasta and brown rice instead of white grains and rice.      Talk to your provider about Calcium and Vitamin D.     Lifestyle    Exercise at least 150 minutes a week (30 minutes a day, 5 days a week). This will help you control your weight and prevent disease.      Limit alcohol to one drink per day.      No smoking.       Wear sunscreen to prevent skin cancer.       See your dentist twice a year for an exam and cleaning.      See your  eye doctor every 1 to 2 years to screen for conditions such as glaucoma, macular degeneration and cataracts.    Labs today  Pneumonia and tetanus shots today  You are due for mammogram.  Please call the following number to make appointment :  [phone]  Schedule colonoscopy at your earliest convenience by calling the following number:  [facility] [phone]  Schedule an appointment for bone density scan at your earliest convenience  Check with your insurance company about coverage for new Shingles vaccine, which is now available  It's called SHINGRIX and is a series of two shots  Make appointment with diabetic educator  Start taking metformin and take once daily for one week then take two tablets daily  Follow up in 1 months  Seek sooner medical attention if there is any worsening of symptoms or problemsWhat Is Diabetes?  If you have diabetes, your body does not make enough insulin (a hormone), or the insulin it makes does not work the way it should. Diabetes is a lifelong disease.  Glucose (sugar) is your body's main source of energy. Insulin carries glucose from the bloodstream into your body's cells. But if you have diabetes, glucose builds up in your blood. This is called high blood glucose (high blood sugar).  High blood glucose can lead to damage in many parts of your body, including your eyes, kidneys, heart, blood vessels, nerves and skin.  What are the symptoms of diabetes?  Symptoms include:    Extreme thirst    Needing to urinate more often    Headache    Hunger    Blurred vision    Feeling drowsy or tired    Slow healing after an illness or injury    Frequent infections.  What should I do if I have diabetes?  Your first step is to learn how to manage your diabetes. The goal is to keep your blood glucose as close to normal as possible. (A normal level is 70 to 100.) By doing this, you can prevent or control damage to your body.  To manage your diabetes, you will need to:    Eat a wide  range of healthy foods.    Manage your weight.    Be physically active.    Test your blood glucose as prescribed.    Control your blood pressure and cholesterol.    Take your medicines as prescribed.  Are there different kinds of diabetes?  There are two basic kinds of diabetes: type 1 and type 2.  Type 1 diabetes  Type 1 diabetes occurs when the cells that make insulin are destroyed by your body's immune system. Your body can no longer make insulin on its own. People who have type 1 diabetes must take insulin to manage it.  Type 2 diabetes  Type 2 diabetes occurs when the cells of your body cannot use insulin or glucose normally. Over time, your body cannot make enough insulin to meet your body's needs. This is the most common kind of diabetes.  You are more likely to develop type 2 diabetes if you:    Are overweight    Have high blood pressure    Have high cholesterol    Are not physically active    Have a family history of type 2 diabetes    Are , /, ,  American or     Are a woman who has given birth to a baby weighing over 9 pounds, or you have had gestational diabetes (diabetes that occurs in pregnancy).  For informational purposes only. Not to replace the advice of your health care provider.  Copyright   2006 [facility]. All rights reserved. [employer] 332442 - REV 12/15.

## 2018-04-03 ENCOUNTER — TELEPHONE (OUTPATIENT)
Dept: FAMILY MEDICINE | Facility: CLINIC | Age: 68
End: 2018-04-03

## 2018-04-03 NOTE — PROGRESS NOTES
Please notify patient by sending following letter with copy of test results      Nicho He,    This is to inform you regarding your test result.    I spoke to you on phone but sending you a result note also.  You have new diagnosis of type 2 diabetes.  HbA1c which is average glucose during last 3 months is 9.1%.  Lab Results       Component                Value               Date                       A1C                      9.1                 04/02/2018            Your diabetes is not under control.   Please make appointment with diabetic educator  This is very important that you see diabetic educator  You will learn a lot from diabetic educator  Your total cholesterol is elevated.  HDL which is called good cholesterol is low.  Your LDL which is called bad cholesterol is elevated.  Take lipitor as prescribed   Eat low cholesterol low fat  diet and do regular physical activity. Avoid high sugar containing food.  Liver and kidney function is fine  Urine for Microalbumin is normal.  Hepatitis C Antibody is negative    Sincerely,      Dr.Nasima Antoinette MD,FACP

## 2018-04-03 NOTE — TELEPHONE ENCOUNTER
Left message asking pt to call back   Would advise she check the user manual (or with the ) of her glucometer to verify what sites the machine is approved for    Kathie BAL RN

## 2018-04-03 NOTE — TELEPHONE ENCOUNTER
Reason for Call:  Other appointment and call back    Detailed comments: patient called and would like a nurse to call her regarding diabetic testing.  She stated that it is difficult for her to reach other arm to do testing and a friend of hers who is a doctor told her it would be ok to poke into her tummy.  Patient wants to make sure this will be ok.    Please call to advise.    Phone Number Patient can be reached at: Home number on file [phone] (home)    Best Time: asap    Can we leave a detailed message on this number? YES    Call taken on 4/3/2018 at 6:28 PM by Elizabeth Solano  .

## 2018-04-04 NOTE — TELEPHONE ENCOUNTER
Called and spoke with patient.    Advised patient contact the  of her specific glucometer, and/or check manual (if available).     Patient reports disability with left arm, so unable to do glucose testing in right fingers.      Patient to call back with any additional questions after contacting .      Fern ORONA RN,BSN

## 2018-04-06 ENCOUNTER — TELEPHONE (OUTPATIENT)
Dept: FAMILY MEDICINE | Facility: CLINIC | Age: 68
End: 2018-04-06

## 2018-04-06 ENCOUNTER — OFFICE VISIT (OUTPATIENT)
Dept: FAMILY MEDICINE | Facility: CLINIC | Age: 68
End: 2018-04-06
Payer: MEDICARE

## 2018-04-06 VITALS
TEMPERATURE: 98.1 F | SYSTOLIC BLOOD PRESSURE: 135 MMHG | BODY MASS INDEX: 23.73 KG/M2 | HEART RATE: 83 BPM | DIASTOLIC BLOOD PRESSURE: 79 MMHG | OXYGEN SATURATION: 98 % | WEIGHT: 139 LBS | HEIGHT: 64 IN

## 2018-04-06 DIAGNOSIS — R42 DIZZINESS: Primary | ICD-10-CM

## 2018-04-06 DIAGNOSIS — E11.65 POORLY CONTROLLED DIABETES MELLITUS (H): ICD-10-CM

## 2018-04-06 PROCEDURE — 93000 ELECTROCARDIOGRAM COMPLETE: CPT | Performed by: INTERNAL MEDICINE

## 2018-04-06 PROCEDURE — 99214 OFFICE O/P EST MOD 30 MIN: CPT | Performed by: INTERNAL MEDICINE

## 2018-04-06 NOTE — TELEPHONE ENCOUNTER
Reason for Call:  Other Help with BS testing    Detailed comments: The patient called and said she does not feel well this morning and that she was unable to take her BS  She has a disability in her left arm and she cannot test her BS in the Am and at HS like Dr Curry wanted   She lives with a roommate but she andrew snot get involved with helping Ying  She did ask the building staff to help her test her BS but no answer yet   She wants to know if we can help arrange help for her to get her Blood Sugars tested?  The patient said she thinks her BS is high this morning and she is feeling a little dizzy    Phone Number Patient can be reached at: Home number on file [phone] (home)    Best Time: anytime    Can we leave a detailed message on this number? YES    Call taken on 4/6/2018 at 9:31 AM by Tomeka Church

## 2018-04-06 NOTE — NURSING NOTE
"Chief Complaint   Patient presents with     Fall       Initial /79  Pulse 83  Temp 98.1  F (36.7  C) (Oral)  Ht 5' 4\" (1.626 m)  Wt 139 lb (63 kg)  LMP 04/02/2003 (Approximate)  SpO2 98%  Breastfeeding? No  BMI 23.86 kg/m2 Estimated body mass index is 23.86 kg/(m^2) as calculated from the following:    Height as of this encounter: 5' 4\" (1.626 m).    Weight as of this encounter: 139 lb (63 kg).  Medication Reconciliation: complete   Jodi Teague CMA      "

## 2018-04-06 NOTE — MR AVS SNAPSHOT
"              After Visit Summary   4/6/2018    Ying Mejia    MRN: [id number]           Patient Information     Date Of Birth          1950        Visit Information        Provider Department      4/6/2018 11:00 AM Kiera Curry MD [facility]        Today's Diagnoses     Dizziness    -  1    Poorly controlled diabetes mellitus (H)          Care Instructions    Keep appointment with diabetic educator.  Follow up in one month          Follow-ups after your visit        Your next 10 appointments already scheduled     Apr 11, 2018  8:30 AM CDT   Diabetic Education with [facility] DIABETIC ED RESOURCE   [facility] [place] ([facility])    [address] [place] [id number] [phone]              Who to contact     If you have questions or need follow up information about today's clinic visit or your schedule please contact [facility] directly at [phone].  Normal or non-critical lab and imaging results will be communicated to you by MyChart, letter or phone within 4 business days after the clinic has received the results. If you do not hear from us within 7 days, please contact the clinic through [employer] or phone. If you have a critical or abnormal lab result, we will notify you by phone as soon as possible.  Submit refill requests through [employer] or call your pharmacy and they will forward the refill request to us. Please allow 3 business days for your refill to be completed.          Additional Information About Your Visit        MyChart Information     [employer] lets you send messages to your doctor, view your test results, renew your prescriptions, schedule appointments and more. To sign up, go to [email or web address] . Click on \"Log in\" on the left side of the screen, which will take you to the Welcome page. Then click on \"Sign up Now\" on the right side of the page.     You will be asked to enter the access code listed below, " "as well as some personal information. Please follow the directions to create your username and password.     Your access code is: [email or web address]  Expires: 2018  8:05 PM     Your access code will  in 90 days. If you need help or a new code, please call your [place] clinic or [phone].        Care EveryWhere ID     This is your Care EveryWhere ID. This could be used by other organizations to access your [place] medical records  [id number]        Your Vitals Were     Pulse Temperature Height Last Period Pulse Oximetry Breastfeeding?    83 98.1  F (36.7  C) (Oral) 5' 4\" (1.626 m) 2003 (Approximate) 98% No    BMI (Body Mass Index)                   23.86 kg/m2            Blood Pressure from Last 3 Encounters:   18 135/79   18 112/67   18 144/79    Weight from Last 3 Encounters:   18 139 lb (63 kg)   18 139 lb 9.6 oz (63.3 kg)   10/19/12 140 lb 3.2 oz (63.6 kg)              We Performed the Following     EKG 12-lead complete w/read - Clinics        Primary Care Provider Office Phone # Fax #    Kiera FRIEDMAN MD [place] [phone] [address] [address]        Equal Access to Services     [facility]: Hadii aad ku hadasho Lilia, waaxda luqadaha, qaybta kaalmada kristal, dave moctezuma . So [facility] [phone].    ATENCIÓN: Si habla español, tiene a torres disposición servicios gratuitos de asistencia lingüística. Llame al [phone].    We comply with applicable federal civil rights laws and [place] laws. We do not discriminate on the basis of race, color, national origin, age, disability, sex, sexual orientation, or gender identity.            Thank you!     Thank you for choosing [facility]  for your care. Our goal is always to provide you with excellent care. Hearing back from our patients is one way we can continue to improve our services. Please take a few minutes to complete " the written survey that you may receive in the mail after your visit with us. Thank you!             Your Updated Medication List - Protect others around you: Learn how to safely use, store and throw away your medicines at www.disposemymeds.org.          This list is accurate as of 4/6/18 11:58 AM.  Always use your most recent med list.                   Brand Name Dispense Instructions for use Diagnosis    aspirin 81 MG tablet     90 tablet    Take 1 tablet (81 mg) by mouth daily    Poorly controlled diabetes mellitus (H)       atorvastatin 10 MG tablet    LIPITOR    90 tablet    Take 1 tablet (10 mg) by mouth daily    Mixed hyperlipidemia       blood glucose lancets standard    no brand specified    100 each    Use to test blood sugar 2 times daily or as directed.    Poorly controlled diabetes mellitus (H)       blood glucose monitoring meter device kit    no brand specified    1 kit    Use to test blood sugar 2 times daily or as directed.    Poorly controlled diabetes mellitus (H)       blood glucose monitoring test strip    no brand specified    100 strip    Use to test blood sugars 2 times daily or as directed    Poorly controlled diabetes mellitus (H)       metFORMIN 500 MG 24 hr tablet    GLUCOPHAGE-XR    60 tablet    Take 2 tablets (1,000 mg) by mouth daily (with dinner)    Poorly controlled diabetes mellitus (H)       omeprazole 40 MG capsule    priLOSEC    14 capsule    Take 1 capsule (40 mg) by mouth daily Take 30-60 minutes before a meal.    Gastroesophageal reflux disease without esophagitis

## 2018-04-06 NOTE — TELEPHONE ENCOUNTER
Pt has called back regarding dizziness and feeling cold. Pt stated that she is concern about her dizziness and feeling cold and wants to speak with triage because shes at work. Pt is having a hard time testing her BG due to arm disability and thinks her BG is high. Trans to katia

## 2018-04-06 NOTE — PROGRESS NOTES
"  SUBJECTIVE:   Ying Mejia is a 67 year old female who presents to clinic today for the following health issues:  Walk in patient, schedules were full but added for short visit    Follow up:  This morning became lightheaded and very cold at work  Newly diabetic  Suspected her BG was high  Was unable to check her BG due to an arm disability  Now doing better  Started Metformin 4/2, takes with dinner  Feels dizzy after eating oatmeal  Wonders if due to oatmeal or medication  For 1 week has been exercising for 1 hour every night    No recent EKG  Seeing diabetic educator on 4/11    Problem list and histories reviewed & adjusted, as indicated.  Additional history: as documented    Labs reviewed in EPIC    Reviewed and updated as needed this visit by clinical staff       Reviewed and updated as needed this visit by Provider       ROS:  Constitutional, HEENT, cardiovascular, pulmonary, gi and gu systems are negative, except as otherwise noted.    This document serves as a record of the services and decisions personally performed and made by Kiera Curry MD. It was created on her behalf by Talat Ramírez, a trained medical scribe. The creation of this document is based the provider's statements to the medical scribe.  Scribelizabeth Ramírez 11:28 AM, April 6, 2018     OBJECTIVE:   /79  Pulse 83  Temp 98.1  F (36.7  C) (Oral)  Ht 1.626 m (5' 4\")  Wt 63 kg (139 lb)  LMP 04/02/2003 (Approximate)  SpO2 98%  Breastfeeding? No  BMI 23.86 kg/m2  Body mass index is 23.86 kg/(m^2).     GENERAL APPEARANCE: healthy, alert and no distress  EYES: Eyes grossly normal to inspection, PERRL and conjunctivae and sclerae normal  HENT: ear canals and TM's normal and nose and mouth without ulcers or lesions  NECK: no adenopathy  RESP: lungs clear to auscultation - no rales, rhonchi or wheezes  CV: regular rates and rhythm, normal S1 S2, no S3    Diagnostic Test Results: EKG - negative    ASSESSMENT/PLAN: "   Ying was seen today for fall.    Diagnoses and all orders for this visit:    Dizziness  -     EKG 12-lead complete w/read - Clinics  Her symptoms resolved completely.  She notices that after eating oatmeal.  She has new diagnosis of diabetes and has been started on medication.  Sometimes when patient has diabetes for long time and then sugars starts to improve then they do not feel good.  Told her to avoid oatmeal as symptoms happens with that.  She has regular heart rate and rhythm.  Feels fine.    Poorly controlled diabetes mellitus (H)  Stay on Metformin  When feeling better increase to 2 pills  Start Lipitor when ready  Stop eating oatmeal and monitor sx  See diabetic educator 4/11  Advised to keep the appointment.  She will follow-up with me in 1 week    FUTURE APPOINTMENTS:       - with diabetes educator on 4/11       - Follow-up for annual visit or as needed.      Even though it was a short appointment but patient had several questions which were answered  The total visit time was 25 minutes more  than 50% was spent in counseling and coordination of care as discussed above.    The information in this document, created by the medical scribe for me, accurately reflects the services I personally performed and the decisions made by me. I have reviewed and approved this document for accuracy prior to leaving the patient care area.  12:01 PM, 04/06/18    Kiera Curry MD  [facility]

## 2018-04-11 ENCOUNTER — ALLIED HEALTH/NURSE VISIT (OUTPATIENT)
Dept: EDUCATION SERVICES | Facility: CLINIC | Age: 68
End: 2018-04-11
Payer: MEDICARE

## 2018-04-11 DIAGNOSIS — R73.09 ELEVATED GLUCOSE: ICD-10-CM

## 2018-04-11 DIAGNOSIS — E11.65 POORLY CONTROLLED DIABETES MELLITUS (H): Primary | ICD-10-CM

## 2018-04-11 PROCEDURE — G0108 DIAB MANAGE TRN  PER INDIV: HCPCS

## 2018-04-11 NOTE — MR AVS SNAPSHOT
After Visit Summary   4/11/2018    Ying Mejia    MRN: [id number]           Patient Information     Date Of Birth          1950        Visit Information        Provider Department      4/11/2018 8:30 AM  DIABETIC ED RESOURCE [facility]        Today's Diagnoses     Poorly controlled diabetes mellitus (H)    -  1    Elevated glucose          Care Instructions    My Diabetes Care Goals:    Healthy Eating: I will eat no more than 45-60 grams of carbohydrates at meals and no more than 15-30 grams of carbohydrates.   Being Active: I will work out in my gym for 30 minutes/day, goal of 150 minutes/week   Monitoring: I will check my blood sugar twice daily and record them on the blood sugar log.     Follow up:  Follow-up diabetes education appointment scheduled on May 2.      Bring blood glucose meter and logbook with you to all doctor and follow-up appointments.     [place] [facility] for the [facility]:  For Your Diabetes Education and Nutrition Appointments Call:  [phone]   For Diabetes Education or Nutrition Related Questions:   Phone: [phone]  E-mail: [email or web address]  Fax: [phone]   If you need a medication refill please contact your pharmacy. Please allow 3 business days for your refills to be completed.    Instructions for emailing the Diabetes Educators    If you need to communicate a non-urgent message to a Diabetes Educator via email, please send to [email or web address].    Please follow the following email guidelines:    Subject line: Secure: your clinic name (example: Secure: [facility])  In the email please include: First name, middle initial, last name and date of birth.    We will be in touch with you within one (1) business day.             Follow-ups after your visit        Your next 10 appointments already scheduled     May 02, 2018  8:30 AM CDT   Diabetic Education with  DIABETIC ED RESOURCE  "  [facility] [place] ([facility])    [address] [place] [address] [phone]              Who to contact     If you have questions or need follow up information about today's clinic visit or your schedule please contact [facility] directly at [phone].  Normal or non-critical lab and imaging results will be communicated to you by MyChart, letter or phone within 4 business days after the clinic has received the results. If you do not hear from us within 7 days, please contact the clinic through MyChart or phone. If you have a critical or abnormal lab result, we will notify you by phone as soon as possible.  Submit refill requests through [employer] or call your pharmacy and they will forward the refill request to us. Please allow 3 business days for your refill to be completed.          Additional Information About Your Visit        [employer] Information     [employer] lets you send messages to your doctor, view your test results, renew your prescriptions, schedule appointments and more. To sign up, go to [email or web address] . Click on \"Log in\" on the left side of the screen, which will take you to the Welcome page. Then click on \"Sign up Now\" on the right side of the page.     You will be asked to enter the access code listed below, as well as some personal information. Please follow the directions to create your username and password.     Your access code is: [email or web address]  Expires: 2018  8:05 PM     Your access code will  in 90 days. If you need help or a new code, please call your [place] clinic or [phone].        Care EveryWhere ID     This is your Care EveryWhere ID. This could be used by other organizations to access your [place] medical records  [id number]        Your Vitals Were     Last Period                   2003 (Approximate)            Blood Pressure from Last 3 Encounters:   18 135/79 "   04/02/18 112/67   03/13/18 144/79    Weight from Last 3 Encounters:   04/06/18 63 kg (139 lb)   04/02/18 63.3 kg (139 lb 9.6 oz)   10/19/12 63.6 kg (140 lb 3.2 oz)              Today, you had the following     No orders found for display       Primary Care Provider Office Phone # Fax #    Kiera FRIEDMAN MD [place] [phone] [address] [facility] [address]  [facility] [address]        Equal Access to Services     [facility]: Hadii aad ku hadasho Lilia, waaxda luqadaha, qaybta kaalmada newtonyashavon, dave moctezuma . So [facility] [phone].    ATENCIÓN: Si habla español, tiene a torres disposición servicios gratuitos de asistencia lingüística. [facility] [phone].    We comply with applicable federal civil rights laws and [place] laws. We do not discriminate on the basis of race, color, national origin, age, disability, sex, sexual orientation, or gender identity.            Thank you!     Thank you for choosing [facility]  for your care. Our goal is always to provide you with excellent care. Hearing back from our patients is one way we can continue to improve our services. Please take a few minutes to complete the written survey that you may receive in the mail after your visit with us. Thank you!             Your Updated Medication List - Protect others around you: Learn how to safely use, store and throw away your medicines at www.disposemymeds.org.          This list is accurate as of 4/11/18  9:29 AM.  Always use your most recent med list.                   Brand Name Dispense Instructions for use Diagnosis    aspirin 81 MG tablet     90 tablet    Take 1 tablet (81 mg) by mouth daily    Poorly controlled diabetes mellitus (H)       atorvastatin 10 MG tablet    LIPITOR    90 tablet    Take 1 tablet (10 mg) by mouth daily    Mixed hyperlipidemia       blood glucose lancets standard    no brand specified    100 each    Use to test blood sugar 2  times daily or as directed.    Poorly controlled diabetes mellitus (H)       blood glucose monitoring meter device kit    no brand specified    1 kit    Use to test blood sugar 2 times daily or as directed.    Poorly controlled diabetes mellitus (H)       blood glucose monitoring test strip    no brand specified    100 strip    Use to test blood sugars 2 times daily or as directed    Poorly controlled diabetes mellitus (H)       metFORMIN 500 MG 24 hr tablet    GLUCOPHAGE-XR    60 tablet    Take 2 tablets (1,000 mg) by mouth daily (with dinner)    Poorly controlled diabetes mellitus (H)       omeprazole 40 MG capsule    priLOSEC    14 capsule    Take 1 capsule (40 mg) by mouth daily Take 30-60 minutes before a meal.    Gastroesophageal reflux disease without esophagitis

## 2018-04-11 NOTE — PATIENT INSTRUCTIONS
My Diabetes Care Goals:    Healthy Eating: I will eat no more than 45-60 grams of carbohydrates at meals and no more than 15-30 grams of carbohydrates.   Being Active: I will work out in my gym for 30 minutes/day, goal of 150 minutes/week   Monitoring: I will check my blood sugar twice daily and record them on the blood sugar log.     Follow up:  Follow-up diabetes education appointment scheduled on May 2.      Bring blood glucose meter and logbook with you to all doctor and follow-up appointments.     [place] [facility] for the [facility]:  For Your Diabetes Education and Nutrition Appointments Call:  [phone]   For Diabetes Education or Nutrition Related Questions:   Phone: [phone]  E-mail: [email or web address]  Fax: [phone]   If you need a medication refill please contact your pharmacy. Please allow 3 business days for your refills to be completed.    Instructions for emailing the Diabetes Educators    If you need to communicate a non-urgent message to a Diabetes Educator via email, please send to [email or web address].    Please follow the following email guidelines:    Subject line: Secure: your clinic name (example: Secure: [facility])  In the email please include: First name, middle initial, last name and date of birth.    We will be in touch with you within one (1) business day.

## 2018-04-11 NOTE — PROGRESS NOTES
Diabetes Self Management Training: Initial Assessment Visit for Newly Diagnosed Patients (Complete AADE Goals Flowsheet)    Ying Mejia presents today for education and evaluation of glucose control related to Type 2 diabetes.    She is accompanied by self    Patient's diabetes management related comments/concerns: feeling dizziness & lightheaded likely due to high blood glucose, got diagnosed recently and started on meds    Patient's emotional response to diabetes: expresses readiness to learn, concern for health and well-being and confidence diabetes can be controlled    Patient would like this visit to be focused around the following diabetes-related behaviors and goals: Diabetes pathophysiology, Assistance with making lifestyle changes, Healthy Eating, Being Active, Monitoring and Taking Medication    ASSESSMENT:  Patient Problem List and Family Medical History reviewed for relevant medical history, current medical status, and diabetes risk factors.    Current Diabetes Management per Patient:  Taking diabetes medications?   yes:     Diabetes Medication(s)     Biguanides Sig    metFORMIN (GLUCOPHAGE-XR) 500 MG 24 hr tablet Take 2 tablets (1,000 mg) by mouth daily (with dinner)          *Abbreviated insulin dose documentation key: Insulin Trade Name (tghpotgaw-rwejo-mcoqed-bedtime) - i.e. Humalog 5-5-5-0 (Humalog 5 units at breakfast, 5 units at lunch, and 5 units at dinner).    Past Diabetes Education: Newly diagnosed    Patient glucose self monitoring as follows: BG meter taught today.   BG meter: Contour meter  BG results: not available     BG values are: unable to assess  Patient's most recent   Lab Results   Component Value Date    A1C 9.1 04/02/2018    is not meeting goal of <7.0    Nutrition:  Patient eats 3 meals per day and has cut out sweets from her diet. She has also been working on portion control with her high carbohydrate foods like rice.     Breakfast - oatmeal with flaxseed, gladys seed,  "sunflower seed, frozen strawberries with milk tea (16 oz) OR yogurt, cashews, almonds, & peanuts   Lunch - 1/2 cup of basmati rice with curried vegetables & lentils  Dinner - chicken & bean soup, enchilada OR rice with curried vegetables & lentils  Snacks - whole avocado    Beverages: Tea + ~2 cups milk 1 mug/day    Cultural/Orthodoxy diet restrictions: does not eat meat but does eat chicken on occasion     Biggest Challenge to Healthy Eating: knowing what to eat    Physical Activity:    Type: treadmill  Duration: 30 minutes  Frequency: daily    Diabetes Risk Factors:  family history and age over 45 years    Diabetes Complications:  Not discussed today.    Vitals:  LMP 04/02/2003 (Approximate)  Estimated body mass index is 23.86 kg/(m^2) as calculated from the following:    Height as of 4/6/18: 1.626 m (5' 4\").    Weight as of 4/6/18: 63 kg (139 lb).   Last 3 BP:   BP Readings from Last 3 Encounters:   04/06/18 135/79   04/02/18 112/67   03/13/18 144/79       History   Smoking Status     Never Smoker   Smokeless Tobacco     Never Used       Labs:  Lab Results   Component Value Date    A1C 9.1 04/02/2018     Lab Results   Component Value Date     04/02/2018     Lab Results   Component Value Date     04/02/2018     HDL Cholesterol   Date Value Ref Range Status   04/02/2018 47 (L) >49 mg/dL Final   ]  GFR Estimate   Date Value Ref Range Status   04/02/2018 >90 >60 mL/min/1.7m2 Final     Comment:     Non  GFR Calc     GFR Estimate If Black   Date Value Ref Range Status   04/02/2018 >90 >60 mL/min/1.7m2 Final     Comment:      GFR Calc     Lab Results   Component Value Date    CR 0.51 04/02/2018     No results found for: MICROALBUMIN    Socio/Economic Considerations:    Support system: co-worker(s) and roommate    Health Beliefs and Attitudes:   Patient Activation Measure Survey Score:  No flowsheet data found.    Stage of Change: PREPARATION (Decided to change - considering " how)      Diabetes knowledge and skills assessment:     Patient is knowledgeable in diabetes management concepts related to: Taking Medication    Patient needs further education on the following diabetes management concepts: Healthy Eating, Being Active, Monitoring, Taking Medication, Problem Solving, Reducing Risks and Healthy Coping    Barriers to Learning Assessment: No Barriers identified    Based on learning assessment above, most appropriate setting for further diabetes education would be: Group class or Individual setting.    INTERVENTION:   Patient had many good questions about diabetes. Her brother also has diabetes and has given her some good advice already. Her biggest concern today was seeing if she had the ability to check blood sugars with the meter she was given by the pharmacy. Writer showed her how to load lancets, strips, and procure blood from finger. Patient was able to demonstrate this and was very happy to know that she could in fact complete this task, despite her disability and limited motility. If this becomes a further issue, she may be a candidate for a Freestyle Shanta CGM but this would be difficult to get covered by her insurance. This was discussed with patient as well.     Education provided today on:  AADE Self-Care Behaviors:  Healthy Eating: carbohydrate counting, consistency in amount, composition, and timing of food intake, portion control and label reading  Being Active: relationship to blood glucose and describe appropriate activity program  Monitoring: purpose, proper technique, log and interpret results, individual blood glucose targets and frequency of monitoring (fasting in AM and prior to dinner, per MD)  Taking Medication: action of prescribed medication and when to take medications. Patient requested to take 1 tablet (500 mg) with breakfast and 1 tablet with dinner. She is on the extended release product. Assured her that splitting the dose would be fine.   Patient was  instructed on Contour meter and was able to provide an accurate return demonstration. Patient's blood glucose reading today was 136 mg/dL.    Opportunities for ongoing education and support in diabetes-self management were discussed.    Pt verbalized understanding of concepts discussed and recommendations provided today.       Education Materials Provided:  [facility] Understanding Diabetes Booklet, BG Log Sheet    PLAN:  See Patient Instructions for co-developed, patient-stated behavior change goals.  AVS printed and provided to patient today.    FOLLOW-UP:  Follow-up appointment scheduled on May 2.  Chart routed to referring provider.    Ongoing plan for education and support: Follow-up visit with diabetes educator in 3 weeks    Preethi Godoy RD, LD  Time Spent: 60 minutes  Encounter Type: Individual    Any diabetes medication dose changes were made via the CDE Protocol and Collaborative Practice Agreement with the patient's referring provider. A copy of this encounter was shared with the provider.

## 2018-04-12 ENCOUNTER — TELEPHONE (OUTPATIENT)
Dept: EDUCATION SERVICES | Facility: CLINIC | Age: 68
End: 2018-04-12

## 2018-04-12 NOTE — TELEPHONE ENCOUNTER
"Ying emailed for help:  My name is Ying Mejia and my YOB: 1950 The blood sugar strips the Diabetic educator Showed me on the 11th Wednesday but when I tried to use last night I was unable to do it I need help I will go to [facility] try to get pharmacist to help me if she is unable would you be able to help me?  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 She was able to demo a BG check at appointment on 4/11.   Reply:     Mango He,   Sorry to hear you're having trouble checking blood sugar!  I did speak to Preethi, and we'll get things figured out to help ;)    One question is, were you unable to check because of not remembering how? Like which end of the strip goes in the meter, or how to get the poker to poke?  Or was it more of an issue of dexterity getting strip or needle in?    We can brainstorm either way!   If you get to [facility] and they can help that's great! If they aren't able to help, we can do a couple of different things:  1) I could call you Friday and we can try to \"talk through it\" on the phone  2) we can try to get you in sooner than your appointment on 5/2 for a quick troubleshooting session- I could see you tomorrow, Friday,  or next Friday the 20th for a quick visit, but I'm in [place]...  3) Preethi said if it is problematic, it's ok to wait until your follow up appointment on 5/2 to practice again- or perhaps you could wear a LibrePro sensor instead of checking to see how that Metformin is working. Preethi can help with that.     We aren't worried about any low blood sugars since that isn't a side effect of the Metformin. More interested to see if numbers are coming down with the medication ;)   Let me know what you think will work best!  Thank you,   Dory Pina RD, LD, CDE    "

## 2018-04-13 NOTE — TELEPHONE ENCOUNTER
Kiara,  Thank you so much.  Yes, the patient did mention she was able to use the lancet device in clinic but once home, she was not able to.  She also tried again with me in clinic and was not sucessful.  She is very committed to doing what is best to control her diabetes but is also very nervious and mentioned she doesn't like blood or needles. Thank you so much for all your help.   Leida Acosta RN

## 2018-04-13 NOTE — TELEPHONE ENCOUNTER
Will reach out to patient again today, recommend try new lancing devices at visit on 5/2.  There are other options that may work better for her, she did not respond to my email of 4/12.  She may benefit from the Microlet Next or AccuChek Fastclix to avoid single lancet need.    Laurie Pina RD, LD, CDE

## 2018-04-13 NOTE — TELEPHONE ENCOUNTER
"DB Ed:  This patient walked in to clinic this evening and I was able to meet with her.    She is having difficulties with her lancet device.  She does not have the dexterity or  strength to \"pull back\" the plunger to ready the needle/device.  It is very hard for her to hold the device with her weak hand and then pull back the plunger with the other hand.    She is currently using the Brooks Microlet2 device.    I'm wondering if the Microlet Next lancet device might be easier?  Or other thoughts?  She did not want to use a single lancet.    Thank you.  Leida Acosta, RN  Triage Nurse with [facility]          "

## 2018-04-19 ENCOUNTER — TELEPHONE (OUTPATIENT)
Dept: FAMILY MEDICINE | Facility: CLINIC | Age: 68
End: 2018-04-19

## 2018-04-19 NOTE — TELEPHONE ENCOUNTER
Yes, she should follow up in 2-3 weeks.  Keep appointment with ray  She should talk to diabetic educator and they can come up with ideas how she can check her sugars with her disability.  Dr.Nasima Antoinette MD

## 2018-04-19 NOTE — TELEPHONE ENCOUNTER
Pt states she is unable to take her blood sugar b/c of her left arm disability. She has an appointment w/MTM on 5/2, but wants to know if you want to see her before or after her appointment with Berta.  Marlena James MA

## 2018-04-19 NOTE — TELEPHONE ENCOUNTER
Reason for Call:  Other call back    Detailed comments: pt unableto get reading due to left arm disability and pt wants to consult doctor and see what she suggest to do has a appt with mtm 5/2/18. Please call    Phone Number Patient can be reached at: Work number on file:  [phone]    Best Time: any    Can we leave a detailed message on this number? YES    Call taken on 4/19/2018 at 12:25 PM by Fabby Spencer

## 2018-04-23 PROBLEM — E78.5 HYPERLIPIDEMIA, UNSPECIFIED HYPERLIPIDEMIA TYPE: Status: ACTIVE | Noted: 2018-04-23

## 2018-04-30 ENCOUNTER — NURSE TRIAGE (OUTPATIENT)
Dept: NURSING | Facility: CLINIC | Age: 68
End: 2018-04-30

## 2018-04-30 NOTE — TELEPHONE ENCOUNTER
Patient reports that she worked with a diabetes educator to learn how to test her blood sugar. Since she's been at home, she has been unable to test, due to being disabled in her left arm. Said she got a blood testing log and asks if she should bring this to her appointment with the diabetes educator on 5-2-18. Nurse told patient that she should bring her log with her to the appointment. Patient also reports a sensation in her left lower abdominal area when she turns. Denies that she has abdominal pain now. Patient stated she is going to talk with her doctor about the sensation at her next clinic appointment.   Kierra Duke RN/FNA

## 2018-05-02 ENCOUNTER — ALLIED HEALTH/NURSE VISIT (OUTPATIENT)
Dept: EDUCATION SERVICES | Facility: CLINIC | Age: 68
End: 2018-05-02
Payer: MEDICARE

## 2018-05-02 ENCOUNTER — HOSPITAL ENCOUNTER (OUTPATIENT)
Dept: MAMMOGRAPHY | Facility: CLINIC | Age: 68
Discharge: HOME | End: 2018-05-02
Attending: INTERNAL MEDICINE | Admitting: INTERNAL MEDICINE
Payer: MEDICARE

## 2018-05-02 DIAGNOSIS — R92.8 ABNORMAL MAMMOGRAM: ICD-10-CM

## 2018-05-02 DIAGNOSIS — E11.65 POORLY CONTROLLED DIABETES MELLITUS (H): Primary | ICD-10-CM

## 2018-05-02 PROCEDURE — G0108 DIAB MANAGE TRN  PER INDIV: HCPCS

## 2018-05-02 PROCEDURE — 77065 DX MAMMO INCL CAD UNI: CPT | Mod: RT

## 2018-05-02 RX ORDER — LANCETS
EACH MISCELLANEOUS
Qty: 102 EACH | Refills: 3 | Status: SHIPPED | OUTPATIENT
Start: 2018-05-02

## 2018-05-02 NOTE — LETTER
Kiera Curry  [address]  [place] [address]      July 2, 2018      Exam Date: 5/2/18    Dear Kiera Curry:  In accordance with Mammography Quality Standards Act of 1992 (MQSA), we are informing you that the below named patient has been mailed two separate notices for a Biopsy.    We feel we have given Ying Mejia 1950 an adequate amount of time to respond. As part of the MQSA, this will be our final attempt to contact the patient.    Please notify us, either by phone [phone] or fax [phone], if the patient has gone to another facility for her follow-up study.    Thank you for any assistance you can provide.        Sincerely,    Your Care Team    [facility]

## 2018-05-02 NOTE — LETTER
[facility]  [address] [place] [address], [address]  [facility] [id number]      Ying Jacobso  [address] [facility] [address]      May 2, 2018      Date of Exam:       Dear Ying:    Thank you for your recent visit.    Breast Imaging Result: Based on your recent breast imaging, you have a suspicious area that usually requires a biopsy, at which time a small tissue sample would be taken from your breast.      If you have already made these arrangements, please disregard this letter.    A report of your breast imaging results was sent to: Kiera Curry    Your breast imaging will become part of your medical file here at [facility] for at least 10 years. You are responsible for informing any new health care provider or breast imaging facility of the date and location of this examination.    We appreciate the opportunity to participate in your health care.    Sincerely,    Niesha Robertson MD  Interpreting Radiologist  [facility]

## 2018-05-02 NOTE — PROGRESS NOTES
Diabetes Self Management Training: Follow-up Visit    Ying Mejia presents today for education and evaluation of glucose control related to Type 2 diabetes.    She is accompanied by self    Patient's diabetes management related comments/concerns: had issues with checking blood sugars due to lancing device too difficult to use with disability    Patient would like this visit to be focused around the following diabetes-related behaviors and goals: Healthy Eating, Being Active, Monitoring and Taking Medication    ASSESSMENT:  Patient Problem List reviewed for relevant medical history and current medical status.    Current Diabetes Management per Patient:  Taking diabetes medications?   yes:     Diabetes Medication(s)     Biguanides Sig    metFORMIN (GLUCOPHAGE-XR) 500 MG 24 hr tablet Take 2 tablets (1,000 mg) by mouth daily (with dinner)      Patient complains of constipation secondary to her metformin as well as eating fewer bananas due to their sugar content.  Patient also mentioned that she has not started her lipitor medication at this time.     Patient glucose self monitoring as follows: never, patient was unable to get lancing device to work for her due to difficulty with dexterity.   BG meter: Contour meter  BG results: not available     BG values are: unable to assess  Patient's most recent   Lab Results   Component Value Date    A1C 9.1 04/02/2018    is not meeting goal of <7.0    Nutrition:  Patient eats 3 meals per day    Breakfast - oatmeal with flaxseed, gladys seed, sunflower seed, frozen strawberries with milk tea (16 oz) OR yogurt, cashews, almonds, & peanuts   Lunch - 1/2 cup of basmati rice with curried vegetables & lentils  Dinner - chicken & bean soup, enchilada OR rice with curried vegetables & lentils  Snacks - whole avocado     Beverages: Tea + ~2 cups milk 1 mug/day    Cultural/Pentecostal diet restrictions: No     Biggest Challenge to Healthy Eating: none    Physical Activity:    Type:  "treadmill  Duration: 30 minutes  Frequency: daily    Diabetes Complications:  Not discussed today.    Vitals:  LMP 04/02/2003 (Approximate)  Estimated body mass index is 23.86 kg/(m^2) as calculated from the following:    Height as of 4/6/18: 1.626 m (5' 4\").    Weight as of 4/6/18: 63 kg (139 lb).   Last 3 BP:   BP Readings from Last 3 Encounters:   04/06/18 135/79   04/02/18 112/67   03/13/18 144/79       History   Smoking Status     Never Smoker   Smokeless Tobacco     Never Used       Labs:  Lab Results   Component Value Date    A1C 9.1 04/02/2018     Lab Results   Component Value Date     04/02/2018     Lab Results   Component Value Date     04/02/2018     HDL Cholesterol   Date Value Ref Range Status   04/02/2018 47 (L) >49 mg/dL Final   ]  GFR Estimate   Date Value Ref Range Status   04/02/2018 >90 >60 mL/min/1.7m2 Final     Comment:     Non  GFR Calc     GFR Estimate If Black   Date Value Ref Range Status   04/02/2018 >90 >60 mL/min/1.7m2 Final     Comment:      GFR Calc     Lab Results   Component Value Date    CR 0.51 04/02/2018     No results found for: MICROALBUMIN    Health Beliefs and Attitudes:   Patient Activation Measure Survey Score:  No flowsheet data found.    Stage of Change: ACTION (Actively working towards change)    Progress toward meeting diabetes-related behavioral goals:    GOALS % Met Goal   Healthy Eating  100   Physical Activity  100   Monitoring  0   Medication Taking  100   Problem Solving  75   Healthy Coping  0   Risk Reduction  100         Diabetes knowledge and skills assessment:     Patient is knowledgeable in diabetes management concepts related to: Healthy Eating, Being Active and Taking Medication    Patient needs further education on the following diabetes management concepts: Healthy Eating, Being Active, Monitoring, Taking Medication, Problem Solving, Reducing Risks and Healthy Coping    Barriers to Learning Assessment: Physical " Disability    Based on learning assessment above, most appropriate setting for further diabetes education would be: Individual setting.    INTERVENTION:  Patient brought in letters from her insurance provider requesting further documentation to show medical need for testing more than once a day. Insurance will cover once daily testing but no more than this. Recommended patient bring these documents to her PCP but also assured patient that once a day testing may be adequate for now since not only is coverage difficult but physically testing blood sugars is difficult with her disability.     Education provided today on:  AADE Self-Care Behaviors:  Monitoring: proper technique, frequency of monitoring (once daily) and educated on use of AccuCheck FastClix lancing device with lancet drum. As this does not require cocking of needle in preparation of testing or regular lancet change, it is a much easier option for patient with physical disability. Patient was able to show that she could utilize this lancing device and agreed it was much easier than the lancing device that came with her meter. She was able to perform a BG test on meter with this device as well as change out needle drum on her own.   Taking Medication: side effects of prescribed medications. Explained that metformin is not associated with constipation but rather diarrhea/nausea. Patient is convinced that constipation only started with metformin. Referred her to her PCP to discuss further.   Reducing Risks: major complications of diabetes, prevention, early diagnostic measures and treatment of complications, foot care, annual eye exam, aspirin therapy, A1C - goals, relating to blood glucose levels, how often to check, lipids levels and goals and blood pressure and goals  Patient was instructed on AccuChek FastClix lancing device and was able to provide an accurate return demonstration. Patient's blood glucose reading today was 113 mg/dL.    Opportunities for  ongoing education and support in diabetes-self management were discussed.    Pt verbalized understanding of concepts discussed and recommendations provided today.       Education Materials Provided:  Heart Healthy Diet Guidelines and Diabetes Care Goals   AccuChek FastClix lancing device & 1 drum of lancets    PLAN:  See Patient Instructions for co-developed, patient-stated behavior change goals.  AVS printed and provided to patient today.    FOLLOW-UP:  Education topics to cover at the next diabetes education visit(s): ability to perform BG checks, BG control  Follow-up with PCP recommended.  Follow-up as needed or yearly for diabetes education review.  Chart routed to referring provider.    Ongoing plan for education and support: Follow-up visit with diabetes educator in July after next Hgb A1C     Preethi Godoy RD, LD  Time Spent: 45 minutes  Encounter Type: Individual    Any diabetes medication dose changes were made via the CDE Protocol and Collaborative Practice Agreement with the patient's referring provider. A copy of this encounter was shared with the provider.

## 2018-05-02 NOTE — MR AVS SNAPSHOT
After Visit Summary   5/2/2018    Ying Mejia    MRN: [id number]           Patient Information     Date Of Birth          1950        Visit Information        Provider Department      5/2/2018 8:30 AM  DIABETIC ED RESOURCE [facility]        Today's Diagnoses     Poorly controlled diabetes mellitus (H)    -  1      Care Instructions    My Diabetes Care Goals:    Monitoring: I will check blood sugars once daily   Taking Medication: I will continue to take metformin 2 tablets with dinner daily    Follow up:  Call [phone]), e-mail ([email or web address]), or send [email or web address] message with questions, concerns or if follow-up is needed.  Recheck your A1c in July and then follow up with diabetes educator after you receive your results      Bring blood glucose meter and logbook with you to all doctor and follow-up appointments.     [facility] for the [facility]:  For Your Diabetes Education and Nutrition Appointments Call:  [phone]   For Diabetes Education or Nutrition Related Questions:   Phone: [phone]  E-mail: [email or web address]  Fax: [phone]   If you need a medication refill please contact your pharmacy. Please allow 3 business days for your refills to be completed.    Instructions for emailing the Diabetes Educators    If you need to communicate a non-urgent message to a Diabetes Educator via email, please send to [email or web address].    Please follow the following email guidelines:    Subject line: Secure: your clinic name (example: Secure: [facility])  In the email please include: First name, middle initial, last name and date of birth.    We will be in touch with you within one (1) business day.             Follow-ups after your visit        Your next 10 appointments already scheduled     May 02, 2018  9:45 AM CDT   MA DIAGNOSTIC DIGITAL RIGHT with [id number]   [facility]  "([facility])    [address]  [place] [id number] [phone]           Do not use any powder, lotion or deodorant under your arms or on your breast. If you do, we will ask you to remove it before your exam.  Wear comfortable, two-piece clothing.  If you have any allergies, tell your care team.  Bring any previous mammograms from other facilities or have them mailed to the breast center.   Three-dimensional (3D) mammograms are available at [place] locations in [facility], [place], [facility], [place], [facility], and [place]. [facility] locations include [place] and [facility] in [place]. Benefits of 3D mammograms include: - Improved rate of cancer detection - Decreases your chance of having to go back for more tests, which means fewer: - \"False-positive\" results (This means that there is an abnormal area but it isn't cancer.) - Invasive testing procedures, such as a biopsy or surgery - Can provide clearer images of the breast if you have dense breast tissue. 3D mammography is an optional exam that anyone can have with a 2D mammogram. It doesn't replace or take the place of a 2D mammogram. 2D mammograms remain an effective screening test for all women.  Not all insurance companies cover the cost of a 3D mammogram. Check with your insurance.            May 17, 2018  8:30 AM CDT   Office Visit with Kiera Curry MD   [facility]    [address] [id number] [phone]           Bring a current list of meds and any records pertaining to this visit. For Physicals, please bring immunization records and any forms needing to be filled out. Please arrive 10 minutes early to complete paperwork.              Who to contact     If you have questions or need follow up information about today's clinic visit or your schedule please contact [facility] directly at " "[phone].  Normal or non-critical lab and imaging results will be communicated to you by MyChart, letter or phone within 4 business days after the clinic has received the results. If you do not hear from us within 7 days, please contact the clinic through Tamehart or phone. If you have a critical or abnormal lab result, we will notify you by phone as soon as possible.  Submit refill requests through SpaceClaim or call your pharmacy and they will forward the refill request to us. Please allow 3 business days for your refill to be completed.          Additional Information About Your Visit        Tamehart Information     SpaceClaim lets you send messages to your doctor, view your test results, renew your prescriptions, schedule appointments and more. To sign up, go to [email or web address] . Click on \"Log in\" on the left side of the screen, which will take you to the Welcome page. Then click on \"Sign up Now\" on the right side of the page.     You will be asked to enter the access code listed below, as well as some personal information. Please follow the directions to create your username and password.     Your access code is: [email or web address]  Expires: 2018  8:05 PM     Your access code will  in 90 days. If you need help or a new code, please call your [facility] clinic or [phone].        Care EveryWhere ID     This is your Care EveryWhere ID. This could be used by other organizations to access your [facility] medical records  [id number]        Your Vitals Were     Last Period                   2003 (Approximate)            Blood Pressure from Last 3 Encounters:   18 135/79   18 112/67   18 144/79    Weight from Last 3 Encounters:   18 63 kg (139 lb)   18 63.3 kg (139 lb 9.6 oz)   10/19/12 63.6 kg (140 lb 3.2 oz)              Today, you had the following     No orders found for display       Primary Care Provider Office Phone # Fax #    Kiera Curry [phone] " [phone]       [address]  [place] [address]        Equal Access to Services     MIRACLE HOWARD : Hadii hawk ku hadmasoodo Sonandaali, waaxda luqadaha, qaybta kaalmada kristal, dave baileyin hayaareal sappleatha daniel jose elias armas. So [facility] [phone].    ATENCIÓN: Si habla español, tiene a torres disposición servicios gratuitos de asistencia lingüística. Llame al [phone].    We comply with applicable federal civil rights laws and [place] laws. We do not discriminate on the basis of race, color, national origin, age, disability, sex, sexual orientation, or gender identity.            Thank you!     Thank you for choosing [facility]  for your care. Our goal is always to provide you with excellent care. Hearing back from our patients is one way we can continue to improve our services. Please take a few minutes to complete the written survey that you may receive in the mail after your visit with us. Thank you!             Your Updated Medication List - Protect others around you: Learn how to safely use, store and throw away your medicines at www.disposemymeds.org.          This list is accurate as of 5/2/18  9:15 AM.  Always use your most recent med list.                   Brand Name Dispense Instructions for use Diagnosis    aspirin 81 MG tablet     90 tablet    Take 1 tablet (81 mg) by mouth daily    Poorly controlled diabetes mellitus (H)       atorvastatin 10 MG tablet    LIPITOR    90 tablet    Take 1 tablet (10 mg) by mouth daily    Mixed hyperlipidemia       blood glucose lancets standard    no brand specified    100 each    Use to test blood sugar 2 times daily or as directed.    Poorly controlled diabetes mellitus (H)       blood glucose monitoring meter device kit    no brand specified    1 kit    Use to test blood sugar 2 times daily or as directed.    Poorly controlled diabetes mellitus (H)       blood glucose monitoring test strip    no brand specified    100 strip     Use to test blood sugars 2 times daily or as directed    Poorly controlled diabetes mellitus (H)       metFORMIN 500 MG 24 hr tablet    GLUCOPHAGE-XR    60 tablet    Take 2 tablets (1,000 mg) by mouth daily (with dinner)    Poorly controlled diabetes mellitus (H)       omeprazole 40 MG capsule    priLOSEC    14 capsule    Take 1 capsule (40 mg) by mouth daily Take 30-60 minutes before a meal.    Gastroesophageal reflux disease without esophagitis

## 2018-05-02 NOTE — PATIENT INSTRUCTIONS
My Diabetes Care Goals:    Monitoring: I will check blood sugars once daily   Taking Medication: I will continue to take metformin 2 tablets with dinner daily    Follow up:  Call ([phone]), e-mail ([email or web address]), or send [email or web address] message with questions, concerns or if follow-up is needed.  Recheck your A1c in July and then follow up with diabetes educator after you receive your results      Bring blood glucose meter and logbook with you to all doctor and follow-up appointments.     [facility] the [facility]:  For Your Diabetes Education and Nutrition Appointments Call:  [phone]   For Diabetes Education or Nutrition Related Questions:   Phone: [phone]  E-mail: [email or web address]  Fax: [phone]   If you need a medication refill please contact your pharmacy. Please allow 3 business days for your refills to be completed.    Instructions for emailing the Diabetes Educators    If you need to communicate a non-urgent message to a Diabetes Educator via email, please send to [email or web address].    Please follow the following email guidelines:    Subject line: Secure: your clinic name (example: Secure: [facility])  In the email please include: First name, middle initial, last name and date of birth.    We will be in touch with you within one (1) business day.

## 2018-05-03 NOTE — PROGRESS NOTES
Okay for diabetes educator to order lancets for new lancing device that is easier for pt to use.     Krysta Dugan, RD LD CDE

## 2018-05-11 ENCOUNTER — TELEPHONE (OUTPATIENT)
Dept: EDUCATION SERVICES | Facility: CLINIC | Age: 68
End: 2018-05-11

## 2018-05-11 NOTE — TELEPHONE ENCOUNTER
"Name pronounced \"Ta-MAR-a\"    Pt has a question re her BG meter and lancets.    Also received email:   I have a question regarding the lanset given to by Diabetic Educator Preethi on the 2May My name is Ying DEUTSCH  1950 I want to know how many days you use the needle Thank you    Returned call and spoke to patient.  1) how often to change lancet? Every 3-4 days, clarified one drum should last ~24 days   She is aware of PCP visit next Thursday  Recommended CDE visit a month after that.   Labs anticipated in July.     Laurie Pina RD, LD, CDE    "

## 2018-05-14 NOTE — PROGRESS NOTES
Spoke to the patient and informed her  regarding mammogram result.  Discussed the importance of rescheduling biopsy as recommended.  She says this problem runs in her family  She had biopsy in the past and it was benign  I still encouraged her that she needs to follow the recommendation and get the biopsy done as advised.  Right now she is dealing with so many other things she wants to wait for a few weeks before she would do that.  Again I encouraged her to take care of this as a priority and  as soon as possible.  She has upcoming appointment to see me she will keep that appointment

## 2018-06-11 ENCOUNTER — TELEPHONE (OUTPATIENT)
Dept: FAMILY MEDICINE | Facility: CLINIC | Age: 68
End: 2018-06-11

## 2018-06-11 NOTE — TELEPHONE ENCOUNTER
Clinic Action Needed: Please call patient with advice at phone number [phone].  Reason for Call: Patient called stating that she has not taken her Metformin the last two days, and her fasting blood sugar has been 117 yesterday and was again 117 this morning.  She would like to know if she could stop taking the medication or not.  She knows that her A1C needs to be checked in July.  Please advise.  Routed to: Care Team    Manuela Mayo RN  [facility]  [phone]

## 2018-06-12 NOTE — TELEPHONE ENCOUNTER
Ok to continue metformin.  Her sugars are improving due to taking medication .  Patients who do not have diabetes also takes metformin for reasons other than diabetes  Metformin does not cause hypoglycemia and it improves utilization of glucose in the body.  So there is so much benefit of taking the metformin  Dr.Nasima Antoinette MD

## 2018-06-12 NOTE — TELEPHONE ENCOUNTER
Spoke with patient  Relayed below message   Pt states she has been reading lots of health articles   Doing Green tea with brenda - pt read this helps lower BGL  Diet has significantly changed - was not careful before with diet per pt   Will follow up in July as planned but pt really does not want to continue metformin despite health benefits   Still did advise to pt that recommendation is to continue with Metformin     Kathie BAL RN

## 2018-08-17 ENCOUNTER — NURSE TRIAGE (OUTPATIENT)
Dept: NURSING | Facility: CLINIC | Age: 68
End: 2018-08-17

## 2018-08-17 DIAGNOSIS — E11.65 POORLY CONTROLLED DIABETES MELLITUS (H): ICD-10-CM

## 2018-08-17 NOTE — TELEPHONE ENCOUNTER
Patient has enough Metformin to last through the weekend, but will need a refill on 8-20-18.  She needs an AIC per protocol, and is making an appointment.  Routed to  [facility]  Fern Jones RN  [facility]

## 2018-08-17 NOTE — TELEPHONE ENCOUNTER
"Last Written Prescription Date:  4/02/18  Last Fill Quantity: 60 tablet,  # refills: 3   Last office visit: 4/6/2018 with prescribing provider:  Antoinette   Future Office Visit:      Requested Prescriptions   Pending Prescriptions Disp Refills     metFORMIN (GLUCOPHAGE-XR) 500 MG 24 hr tablet [Pharmacy Med Name: MetFORMIN HCl ER Oral Tablet Extended Release 24 Hour 500 MG] 60 tablet 2     Sig: Take 2 tablets (1,000 mg) by mouth daily (with dinner)    Biguanide Agents Failed    8/17/2018  6:21 PM       Failed - Patient has documented A1c within the specified period of time.    If HgbA1C is 8 or greater, it needs to be on file within the past 3 months.  If less than 8, must be on file within the past 6 months.     Recent Labs   Lab Test  04/02/18   0940   A1C  9.1*            Passed - Blood pressure less than 140/90 in past 6 months    BP Readings from Last 3 Encounters:   04/06/18 135/79   04/02/18 112/67   03/13/18 144/79                Passed - Patient has documented LDL within the past 12 mos.    Recent Labs   Lab Test  04/02/18   0940   LDL  156*            Passed - Patient has had a Microalbumin in the past 12 mos.    Recent Labs   Lab Test  04/02/18   1108   MICROL  7   UMALCR  7.18            Passed - Patient is age 10 or older       Passed - Patient's CR is NOT>1.4 OR Patient's EGFR is NOT<45 within past 12 mos.    Recent Labs   Lab Test  04/02/18   0940   GFRESTIMATED  >90   GFRESTBLACK  >90       Recent Labs   Lab Test  04/02/18   0940   CR  0.51*            Passed - Patient does NOT have a diagnosis of CHF.       Passed - Patient is not pregnant       Passed - Patient has not had a positive pregnancy test within the past 12 mos.        Passed - Recent (6 mo) or future (30 days) visit within the authorizing provider's specialty    Patient had office visit in the last 6 months or has a visit in the next 30 days with authorizing provider or within the authorizing provider's specialty.  See \"Patient Info\" tab in " "inbasket, or \"Choose Columns\" in Meds & Orders section of the refill encounter.              "

## 2018-08-20 RX ORDER — METFORMIN HCL 500 MG
TABLET, EXTENDED RELEASE 24 HR ORAL
Qty: 60 TABLET | Refills: 2 | Status: SHIPPED | OUTPATIENT
Start: 2018-08-20 | End: 2018-09-06

## 2018-08-20 NOTE — TELEPHONE ENCOUNTER
Routing refill request to provider for review/approval because:  Labs out of range:    04/02/18   0940    A1C  9.1*     Please review and authorize if appropriate,     Thank you,   Luis Antonio ARANDA RN

## 2018-09-06 ENCOUNTER — OFFICE VISIT (OUTPATIENT)
Dept: FAMILY MEDICINE | Facility: CLINIC | Age: 68
End: 2018-09-06
Payer: MEDICARE

## 2018-09-06 VITALS
HEART RATE: 79 BPM | RESPIRATION RATE: 16 BRPM | BODY MASS INDEX: 21.56 KG/M2 | TEMPERATURE: 97.8 F | SYSTOLIC BLOOD PRESSURE: 122 MMHG | WEIGHT: 126.3 LBS | OXYGEN SATURATION: 99 % | DIASTOLIC BLOOD PRESSURE: 74 MMHG | HEIGHT: 64 IN

## 2018-09-06 DIAGNOSIS — Z29.9 PREVENTIVE MEASURE: ICD-10-CM

## 2018-09-06 DIAGNOSIS — E11.65 POORLY CONTROLLED DIABETES MELLITUS (H): Primary | ICD-10-CM

## 2018-09-06 DIAGNOSIS — Z78.0 ASYMPTOMATIC POSTMENOPAUSAL STATUS: ICD-10-CM

## 2018-09-06 DIAGNOSIS — E11.9 TYPE 2 DIABETES MELLITUS WITHOUT COMPLICATION, WITHOUT LONG-TERM CURRENT USE OF INSULIN (H): Primary | ICD-10-CM

## 2018-09-06 DIAGNOSIS — Z12.11 SCREEN FOR COLON CANCER: ICD-10-CM

## 2018-09-06 DIAGNOSIS — E78.2 MIXED HYPERLIPIDEMIA: ICD-10-CM

## 2018-09-06 DIAGNOSIS — M79.672 LEFT FOOT PAIN: ICD-10-CM

## 2018-09-06 LAB
ANION GAP SERPL CALCULATED.3IONS-SCNC: 7 MMOL/L (ref 3–14)
BUN SERPL-MCNC: 13 MG/DL (ref 7–30)
CALCIUM SERPL-MCNC: 8.9 MG/DL (ref 8.5–10.1)
CHLORIDE SERPL-SCNC: 103 MMOL/L (ref 94–109)
CHOLEST SERPL-MCNC: 191 MG/DL
CO2 SERPL-SCNC: 27 MMOL/L (ref 20–32)
CREAT SERPL-MCNC: 0.52 MG/DL (ref 0.52–1.04)
GFR SERPL CREATININE-BSD FRML MDRD: >90 ML/MIN/1.7M2
GLUCOSE SERPL-MCNC: 115 MG/DL (ref 70–99)
HBA1C MFR BLD: 5.9 % (ref 0–5.6)
HDLC SERPL-MCNC: 54 MG/DL
LDLC SERPL CALC-MCNC: 119 MG/DL
NONHDLC SERPL-MCNC: 137 MG/DL
POTASSIUM SERPL-SCNC: 3.7 MMOL/L (ref 3.4–5.3)
SODIUM SERPL-SCNC: 137 MMOL/L (ref 133–144)
TRIGL SERPL-MCNC: 90 MG/DL

## 2018-09-06 PROCEDURE — 83036 HEMOGLOBIN GLYCOSYLATED A1C: CPT | Performed by: INTERNAL MEDICINE

## 2018-09-06 PROCEDURE — 80048 BASIC METABOLIC PNL TOTAL CA: CPT | Performed by: INTERNAL MEDICINE

## 2018-09-06 PROCEDURE — 36415 COLL VENOUS BLD VENIPUNCTURE: CPT | Performed by: INTERNAL MEDICINE

## 2018-09-06 PROCEDURE — 80061 LIPID PANEL: CPT | Performed by: INTERNAL MEDICINE

## 2018-09-06 PROCEDURE — 99214 OFFICE O/P EST MOD 30 MIN: CPT | Performed by: INTERNAL MEDICINE

## 2018-09-06 RX ORDER — METFORMIN HCL 500 MG
1000 TABLET, EXTENDED RELEASE 24 HR ORAL
Qty: 180 TABLET | Refills: 1 | Status: SHIPPED | OUTPATIENT
Start: 2018-09-06 | End: 2019-07-05

## 2018-09-06 RX ORDER — ATORVASTATIN CALCIUM 10 MG/1
10 TABLET, FILM COATED ORAL DAILY
Qty: 90 TABLET | Refills: 1 | Status: SHIPPED | OUTPATIENT
Start: 2018-09-06

## 2018-09-06 NOTE — LETTER
[facility]  [address] [facility]  [address]  [place]  [address]  Tel: [phone]    September 7, 2018    Ying Mejia  [address] [facility] [address]        Nicho He,    This is to inform you regarding your test result.    Your total cholesterol is normal.  HDL which is called good cholesterol is normal.  Your LDL cholesterol is elevated.  Your triglycerides are normal.  Basic metabolic panel which includes electrolytes and kidney fucntion is satisfactory .  HbA1c which is average glucose during last 3 months is 5.9%.  The numbers have improved remarkably  Follow up in 4 months  Continue present management        Sincerely,      Dr.Nasima Antoinette MD,FACP / garth      Resulted Orders   HEMOGLOBIN A1C   Result Value Ref Range    Hemoglobin A1C 5.9 (H) 0 - 5.6 %      Comment:      Normal <5.7% Prediabetes 5.7-6.4%  Diabetes 6.5% or higher - adopted from ADA   consensus guidelines.     Lipid panel reflex to direct LDL Fasting   Result Value Ref Range    Cholesterol 191 <200 mg/dL    Triglycerides 90 <150 mg/dL    HDL Cholesterol 54 >49 mg/dL    LDL Cholesterol Calculated 119 (H) <100 mg/dL      Comment:      Above desirable:  100-129 mg/dl  Borderline High:  130-159 mg/dL  High:             160-189 mg/dL  Very high:       >189 mg/dl      Non HDL Cholesterol 137 (H) <130 mg/dL      Comment:      Above Desirable:  130-159 mg/dl  Borderline high:  160-189 mg/dl  High:             190-219 mg/dl  Very high:       >219 mg/dl     Basic metabolic panel   Result Value Ref Range    Sodium 137 133 - 144 mmol/L    Potassium 3.7 3.4 - 5.3 mmol/L    Chloride 103 94 - 109 mmol/L    Carbon Dioxide 27 20 - 32 mmol/L    Anion Gap 7 3 - 14 mmol/L    Glucose 115 (H) 70 - 99 mg/dL    Urea Nitrogen 13 7 - 30 mg/dL    Creatinine 0.52 0.52 - 1.04 mg/dL    GFR Estimate >90 >60 mL/min/1.7m2      Comment:      Non  GFR Calc    GFR Estimate If Black >90 >60 mL/min/1.7m2      Comment:        GFR Calc    Calcium 8.9 8.5 - 10.1 mg/dL

## 2018-09-06 NOTE — PROGRESS NOTES
SUBJECTIVE:   Ying Mejia is a 67 year old female who presents to clinic today for the following health issues:      Diabetes Follow-up    Patient is checking blood sugars: once daily.  Results are as follows:         am -     Diabetic concerns: None     Symptoms of hypoglycemia (low blood sugar): none     Paresthesias (numbness or burning in feet) or sores: No     Date of last diabetic eye exam: Unknown    BP Readings from Last 2 Encounters:   09/06/18 122/74   04/06/18 135/79     Hemoglobin A1C (%)   Date Value   04/02/2018 9.1 (H)     LDL Cholesterol Calculated (mg/dL)   Date Value   04/02/2018 156 (H)       Diabetes Management Resources    Amount of exercise or physical activity: 4-5 days/week for an average of 15-30 minutes    Problems taking medications regularly: No    Medication side effects: none    Diet: diabetic        Diabetic educator helped her she can check her blood sugar.  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  Additional history: as documented    Labs reviewed in EPIC    Reviewed and updated as needed this visit by clinical staff  Tobacco  Allergies  Meds  Problems       Reviewed and updated as needed this visit by Provider  Allergies  Meds  Problems         ROS:  Constitutional, neuro, ENT, endocrine, pulmonary, cardiac, gastrointestinal, genitourinary, musculoskeletal, integument and psychiatric systems are negative, except as otherwise noted.    OBJECTIVE:                                                    /74 (BP Location: Right arm, Patient Position: Sitting, Cuff  "Size: Adult Regular)  Pulse 79  Temp 97.8  F (36.6  C) (Tympanic)  Resp 16  Ht 5' 4\" (1.626 m)  Wt 126 lb 4.8 oz (57.3 kg)  LMP 04/02/2003 (Approximate)  SpO2 99%  BMI 21.68 kg/m2  Body mass index is 21.68 kg/(m^2).  She is alert awake oriented  She is nice and pleasant         ASSESSMENT/PLAN:                                                      Ying was seen today for diabetes.    Diagnoses and all orders for this visit:    Type 2 diabetes mellitus without complication, without long-term current use of insulin (H)  -     HEMOGLOBIN A1C  -     Lipid panel reflex to direct LDL Fasting  -     Basic metabolic panel  -     metFORMIN (GLUCOPHAGE-XR) 500 MG 24 hr tablet; Take 2 tablets (1,000 mg) by mouth daily (with dinner)  Blood sugars are improving  Educated her about taking vitamin B12 as she is on Metformin which affects absorption    Preventive measure  -     Cyanocobalamin (B-12) 1000 MCG TBCR; Take 1,000 mcg by mouth daily    Left foot pain  -     PODIATRY/FOOT & ANKLE SURGERY REFERRAL    Mixed hyperlipidemia  -     atorvastatin (LIPITOR) 10 MG tablet; Take 1 tablet (10 mg) by mouth daily  Has not been taking Lipitor  Discussed with her the importance of that    Screen for colon cancer  -     Fecal colorectal cancer screen (FIT); Future  Discussed the importance of colonoscopy but she wants to do fit test    Asymptomatic postmenopausal status  Not Interested in bone density at this point    Other orders  -     Cancel: DEXA HIP/PELVIS/SPINE - Future; Future    She is due for breast biopsy and I discussed the importance of taking care of that  She has been postponing that as wants to take care of her diabetes  Discussed with her that if breast cancer is detected at early stage it is treatable and curable  Discussed the importance of scheduling that as soon as possible  If she needs a note for the work I would provide that but she said she is able to take time off    We will discuss ACE inhibitor next " time as I do not want her to overwhelmed with too many things today  Discussed the importance of the scheduling ophthalmology appointment for an annual eye exam    Follow up with Provider - 4 months.   Patient Instructions   Labs today  There is this is a new shingles vaccine available called shingrex  It is a series of 2 shots 2-6 months apart.  Considered more than 90% effective.  Please go to our pharmacy located in [address] to get the  vaccine  Metformin affects vit B12  So take vit B12 OTC 1000 mcg daily  Please schedule your breast biopsy as soon as possible  Stop by at [address] and set it up.  Do fit test  Make appointment with podiatry   Make appointment with  ophthalmologist for eye exam          Kiera Curry MD  [facility]

## 2018-09-06 NOTE — PATIENT INSTRUCTIONS
Labs today  There is this is a new shingles vaccine available called shingrex  It is a series of 2 shots 2-6 months apart.  Considered more than 90% effective.  Please go to our pharmacy located in [address] to get the  vaccine  Metformin affects vit B12  So take vit B12 OTC 1000 mcg daily  Please schedule your breast biopsy as soon as possible  Stop by at [address] and set it up.  Do fit test  Make appointment with podiatry   Make appointment with  ophthalmologist for eye exam

## 2018-09-06 NOTE — MR AVS SNAPSHOT
After Visit Summary   9/6/2018    Ying Mejia    MRN: [id number]           Patient Information     Date Of Birth          1950        Visit Information        Provider Department      9/6/2018 8:30 AM Kiera Curry MD [facility]        Today's Diagnoses     Type 2 diabetes mellitus without complication, without long-term current use of insulin (H)    -  1    Preventive measure        Left foot pain        Mixed hyperlipidemia        Screen for colon cancer        Asymptomatic postmenopausal status          Care Instructions    Labs today  There is this is a new shingles vaccine available called shingrex  It is a series of 2 shots 2-6 months apart.  Considered more than 90% effective.  Please go to our pharmacy located in [place] [address] to get the  vaccine  Metformin affects vit B12  So take vit B12 OTC 1000 mcg daily  Please schedule your breast biopsy as soon as possible  Stop by at [address] and set it up.  Do fit test  Make appointment with podiatry   Make appointment with  ophthalmologist for eye exam              Follow-ups after your visit        Additional Services     PODIATRY/FOOT & ANKLE SURGERY REFERRAL       Your provider has referred you to: [facility]: [facility] [phone]   [email or web address]    Please be aware that coverage of these services is subject to the terms and limitations of your health insurance plan.  Call member services at your health plan with any benefit or coverage questions.      Please bring the following to your appointment:  >>   Any x-rays, CTs or MRIs which have been performed.  Contact the facility where they were done to arrange for  prior to your scheduled appointment.    >>   List of current medications   >>   This referral request   >>   Any documents/labs given to you for this referral                  Follow-up notes from your care team     Return in about 4 months (around 1/6/2019) for  "diabetes, medication follow up.      Future tests that were ordered for you today     Open Future Orders        Priority Expected Expires Ordered    Fecal colorectal cancer screen (FIT) Routine 9/27/2018 11/29/2018 9/6/2018            Who to contact     If you have questions or need follow up information about today's clinic visit or your schedule please contact [facility] directly at [phone].  Normal or non-critical lab and imaging results will be communicated to you by MyChart, letter or phone within 4 business days after the clinic has received the results. If you do not hear from us within 7 days, please contact the clinic through MyChart or phone. If you have a critical or abnormal lab result, we will notify you by phone as soon as possible.  Submit refill requests through [email or web address] or call your pharmacy and they will forward the refill request to us. Please allow 3 business days for your refill to be completed.          Additional Information About Your Visit        Care EveryWhere ID     This is your Care EveryWhere ID. This could be used by other organizations to access your [place] medical records  [id number]        Your Vitals Were     Pulse Temperature Respirations Height Last Period Pulse Oximetry    79 97.8  F (36.6  C) (Tympanic) 16 5' 4\" (1.626 m) 04/02/2003 (Approximate) 99%    BMI (Body Mass Index)                   21.68 kg/m2            Blood Pressure from Last 3 Encounters:   09/06/18 122/74   04/06/18 135/79   04/02/18 112/67    Weight from Last 3 Encounters:   09/06/18 126 lb 4.8 oz (57.3 kg)   04/06/18 139 lb (63 kg)   04/02/18 139 lb 9.6 oz (63.3 kg)              We Performed the Following     Basic metabolic panel     HEMOGLOBIN A1C     Lipid panel reflex to direct LDL Fasting     PODIATRY/FOOT & ANKLE SURGERY REFERRAL          Today's Medication Changes          These changes are accurate as of 9/6/18  8:55 AM.  If you have any questions, ask your nurse or doctor.          "      Start taking these medicines.        Dose/Directions    B-12 1000 MCG Tbcr   Used for:  Preventive measure   Started by:  Kiera Curry MD        Dose:  1000 mcg   Take 1,000 mcg by mouth daily   Quantity:  100 tablet   Refills:  1            Where to get your medicines      These medications were sent to [id number] - [place] - [address]  [address], [place] [address]     Phone:  [phone]     atorvastatin 10 MG tablet    metFORMIN 500 MG 24 hr tablet         Some of these will need a paper prescription and others can be bought over the counter.  Ask your nurse if you have questions.     You don't need a prescription for these medications     B-12 1000 MCG Tbcr                Primary Care Provider Office Phone # Fax #    Kiera Curry [phone] [address]  [place] [address]        Equal Access to Services     [facility]: Hadii aad ku hadasho Lilia, waaxda luqadaha, qaybta kaalmada adeleathayada, waxay baileyin leslee moctezuma . So [facility] [phone].    ATENCIÓN: Si habla español, tiene a torres disposición servicios gratuitos de asistencia lingüística. Llame al [phone].    We comply with applicable federal civil rights laws and [place] laws. We do not discriminate on the basis of race, color, national origin, age, disability, sex, sexual orientation, or gender identity.            Thank you!     Thank you for choosing [facility]  for your care. Our goal is always to provide you with excellent care. Hearing back from our patients is one way we can continue to improve our services. Please take a few minutes to complete the written survey that you may receive in the mail after your visit with us. Thank you!             Your Updated Medication List - Protect others around you: Learn how to safely use, store and throw away your medicines at www.disposemymeds.org.          This list is accurate as of 9/6/18   8:55 AM.  Always use your most recent med list.                   Brand Name Dispense Instructions for use Diagnosis    aspirin 81 MG tablet     90 tablet    Take 1 tablet (81 mg) by mouth daily    Poorly controlled diabetes mellitus (H)       atorvastatin 10 MG tablet    LIPITOR    90 tablet    Take 1 tablet (10 mg) by mouth daily    Mixed hyperlipidemia       B-12 1000 MCG Tbcr     100 tablet    Take 1,000 mcg by mouth daily    Preventive measure       blood glucose lancets standard    no brand specified    100 each    Use to test blood sugar 2 times daily or as directed.    Poorly controlled diabetes mellitus (H)       blood glucose monitoring lancets     102 each    Use to test blood sugar 1 times daily or as directed.    Poorly controlled diabetes mellitus (H)       blood glucose monitoring meter device kit    no brand specified    1 kit    Use to test blood sugar 2 times daily or as directed.    Poorly controlled diabetes mellitus (H)       blood glucose monitoring test strip    no brand specified    100 strip    Use to test blood sugars 2 times daily or as directed    Poorly controlled diabetes mellitus (H)       metFORMIN 500 MG 24 hr tablet    GLUCOPHAGE-XR    180 tablet    Take 2 tablets (1,000 mg) by mouth daily (with dinner)    Type 2 diabetes mellitus without complication, without long-term current use of insulin (H)

## 2018-09-07 NOTE — PROGRESS NOTES
Please notify patient by sending following letter with copy of test results      Nicho He,    This is to inform you regarding your test result.    Your total cholesterol is normal.  HDL which is called good cholesterol is normal.  Your LDL cholesterol is elevated.  Your triglycerides are normal.  Basic metabolic panel which includes electrolytes and kidney fucntion is satisfactory .  HbA1c which is average glucose during last 3 months is 5.9%.  The numbers have improved remarkably  Follow up in 4 months  Continue present management        Sincerely,      Dr.Nasima Antoinette MD,FACP

## 2018-09-11 ENCOUNTER — TELEPHONE (OUTPATIENT)
Dept: FAMILY MEDICINE | Facility: CLINIC | Age: 68
End: 2018-09-11

## 2018-09-11 NOTE — TELEPHONE ENCOUNTER
Reason for Call:  Other call back    Detailed comments: Pt calling regarding B12 OTC vitamin ordered by Dr. Curry.  She picked up Bcomplex & wants to know if that will work since it has B12 in it?  Also needs A1C level from lab draw, has not received notification.      Phone Number Patient can be reached at: Work number on file:  [phone] (work)    Best Time: any    Can we leave a detailed message on this number? YES    Call taken on 9/11/2018 at 6:10 PM by Demetra Pena

## 2018-09-12 NOTE — TELEPHONE ENCOUNTER
Please return call explaining the B12 to the pt.  She is very mad that she couldn't be told immediately about it.  She also wanted to go through all of her labs and I suggested олег and sent her an email for her to be able to get instantaneously.  She would also like a paper form of results mailed out to her.  She hung up on me, I am not quite sure why?

## 2018-09-12 NOTE — TELEPHONE ENCOUNTER
Called the patient back.    I asked if she ever received the letter that was sent on 9/7/18. Patient reports she just received this yesterday. She had questions regarding the results which I answered. I also advised that for B12, Dr Curry would like to ensure that Mary is receiving 1000 mcg orally each day. I advised she check the bottle on her B complex and if she is not getting near that ragini for B12 I advised she get a separate B12 supplement.    Patient has no further questions at this time and will follow-up with Antoinette in January.    Aníbal Jovel, [facility]

## 2019-01-09 DIAGNOSIS — E11.65 POORLY CONTROLLED DIABETES MELLITUS (H): ICD-10-CM

## 2019-01-10 NOTE — TELEPHONE ENCOUNTER
"  blood glucose monitoring (NO BRAND SPECIFIED) test strip 100 strip 1 4/2/2018         Last Written Prescription Date:  04/02/2018  Last Fill Quantity: 100,  # refills: 1   Last office visit: 9/6/2018 with prescribing provider:     Future Office Visit:   Next 5 appointments (look out 90 days)    Jan 22, 2019  8:00 AM CST  Office Visit with Kiera Curry MD  [facility] ([facility] [address] [facility] [id number] [phone]           Requested Prescriptions   Pending Prescriptions Disp Refills     blood glucose (BROOKS CONTOUR) test strip [Pharmacy Med Name: Brooks Contour Test In Vitro Strip] 100 each 2     Sig: TEST BLOOD GLUCOSE 2 TIMES DAILY.    Diabetic Supplies Protocol Passed - 1/9/2019  7:07 PM       Passed - Medication is active on med list       Passed - Patient is 18 years of age or older       Passed - Recent (6 mo) or future (30 days) visit within the authorizing provider's specialty    Patient had office visit in the last 6 months or has a visit in the next 30 days with authorizing provider.  See \"Patient Info\" tab in inbasket, or \"Choose Columns\" in Meds & Orders section of the refill encounter.              "

## 2019-01-16 ENCOUNTER — TELEPHONE (OUTPATIENT)
Dept: FAMILY MEDICINE | Facility: CLINIC | Age: 69
End: 2019-01-16

## 2019-01-16 DIAGNOSIS — E11.65 POORLY CONTROLLED DIABETES MELLITUS (H): ICD-10-CM

## 2019-01-16 NOTE — TELEPHONE ENCOUNTER
"Fax from pharmacy  Need medicare compliant Rx, please resend with note \"patient does not use insulin\"    Pended as requested    Unique Lou, RT (R)    "

## 2019-06-08 ENCOUNTER — TRANSFERRED RECORDS (OUTPATIENT)
Dept: HEALTH INFORMATION MANAGEMENT | Facility: CLINIC | Age: 69
End: 2019-06-08

## 2019-07-05 DIAGNOSIS — E11.9 TYPE 2 DIABETES MELLITUS WITHOUT COMPLICATION, WITHOUT LONG-TERM CURRENT USE OF INSULIN (H): ICD-10-CM

## 2019-07-06 NOTE — TELEPHONE ENCOUNTER
"Last Written Prescription Date:  9/06/18  Last Fill Quantity: 180 tablet,  # refills: 1   Last office visit: 9/6/2018 with prescribing provider:  Antoinette   Future Office Visit:      Requested Prescriptions   Pending Prescriptions Disp Refills     metFORMIN (GLUCOPHAGE-XR) 500 MG 24 hr tablet [Pharmacy Med Name: metFORMIN HCl ER Oral Tablet Extended Release 24 Hour 500 MG] 60 tablet 0     Sig: Take 2 tablets (1,000 mg) by mouth daily (with dinner)       Biguanide Agents Failed - 7/5/2019  6:43 PM        Failed - Blood pressure less than 140/90 in past 6 months     BP Readings from Last 3 Encounters:   09/06/18 122/74   04/06/18 135/79   04/02/18 112/67                 Failed - Patient has had a Microalbumin in the past 15 mos.     Recent Labs   Lab Test 04/02/18  1108   MICROL 7   UMALCR 7.18             Failed - Patient has documented A1c within the specified period of time.     If HgbA1C is 8 or greater, it needs to be on file within the past 3 months.  If less than 8, must be on file within the past 6 months.     Recent Labs   Lab Test 09/06/18 0906   A1C 5.9*             Failed - Recent (6 mo) or future (30 days) visit within the authorizing provider's specialty     Patient had office visit in the last 6 months or has a visit in the next 30 days with authorizing provider or within the authorizing provider's specialty.  See \"Patient Info\" tab in inbasket, or \"Choose Columns\" in Meds & Orders section of the refill encounter.            Passed - Patient has documented LDL within the past 12 mos.     Recent Labs   Lab Test 09/06/18 0906   *             Passed - Patient is age 10 or older        Passed - Patient's CR is NOT>1.4 OR Patient's EGFR is NOT<45 within past 12 mos.     Recent Labs   Lab Test 09/06/18 0906   GFRESTIMATED >90   GFRESTBLACK >90       Recent Labs   Lab Test 09/06/18 0906   CR 0.52             Passed - Patient does NOT have a diagnosis of CHF.        Passed - Medication is active on med " list        Passed - Patient is not pregnant        Passed - Patient has not had a positive pregnancy test within the past 12 mos.

## 2019-07-08 RX ORDER — METFORMIN HCL 500 MG
1000 TABLET, EXTENDED RELEASE 24 HR ORAL
Qty: 30 TABLET | Refills: 0 | Status: SHIPPED | OUTPATIENT
Start: 2019-07-08

## 2019-07-08 NOTE — TELEPHONE ENCOUNTER
Routing refill request to provider for review/approval because:  Labs not current.  Added in pharm comments-Due for apt and labs  Please authorize if appropriate.  Thanks,  Marilee Mcgraw RN

## 2023-06-26 NOTE — NURSING NOTE
"Chief Complaint   Patient presents with     Wellness Visit       Initial /67 (BP Location: Right arm, Cuff Size: Adult Regular)  Pulse 88  Temp 98.7  F (37.1  C) (Oral)  Ht 5' 4\" (1.626 m)  Wt 139 lb 9.6 oz (63.3 kg)  LMP 04/02/2003 (Approximate)  SpO2 98%  BMI 23.96 kg/m2 Estimated body mass index is 23.96 kg/(m^2) as calculated from the following:    Height as of this encounter: 5' 4\" (1.626 m).    Weight as of this encounter: 139 lb 9.6 oz (63.3 kg).  Medication Reconciliation: complete   Ira Liang MA    "
Screening Questionnaire for Adult Immunization    Are you sick today?   No   Do you have allergies to medications, food, a vaccine component or latex?   No   Have you ever had a serious reaction after receiving a vaccination?   No   Do you have a long-term health problem with heart disease, lung disease, asthma, kidney disease, metabolic disease (e.g. diabetes), anemia, or other blood disorder?   No   Do you have cancer, leukemia, HIV/AIDS, or any other immune system problem?   No   In the past 3 months, have you taken medications that affect  your immune system, such as prednisone, other steroids, or anticancer drugs; drugs for the treatment of rheumatoid arthritis, Crohn s disease, or psoriasis; or have you had radiation treatments?   No   Have you had a seizure, or a brain or other nervous system problem?   No   During the past year, have you received a transfusion of blood or blood     products, or been given immune (gamma) globulin or antiviral drug?   No   For women: Are you pregnant or is there a chance you could become        pregnant during the next month?   No   Have you received any vaccinations in the past 4 weeks?   No     Immunization questionnaire answers were all negative.        Per orders of Dr. Curry, injection of TD and Prevnar 13 given by Radha Plasencia. Patient instructed to remain in clinic for 15 minutes afterwards, and to report any adverse reaction to me immediately.  Prior to injection verified patient identity using patient's name and date of birth.       Screening performed by Radha Plasencia on 4/2/2018 at 12:48 PM.  Radha Plasencia, [facility]      
no